# Patient Record
Sex: FEMALE | Race: WHITE | NOT HISPANIC OR LATINO | Employment: FULL TIME | URBAN - METROPOLITAN AREA
[De-identification: names, ages, dates, MRNs, and addresses within clinical notes are randomized per-mention and may not be internally consistent; named-entity substitution may affect disease eponyms.]

---

## 2023-08-24 ENCOUNTER — HOSPITAL ENCOUNTER (OUTPATIENT)
Dept: DATA CONVERSION | Facility: HOSPITAL | Age: 38
Discharge: HOME | End: 2023-08-24
Payer: COMMERCIAL

## 2023-08-24 DIAGNOSIS — Z01.419 ENCOUNTER FOR GYNECOLOGICAL EXAMINATION (GENERAL) (ROUTINE) WITHOUT ABNORMAL FINDINGS: ICD-10-CM

## 2023-08-24 DIAGNOSIS — Z11.51 ENCOUNTER FOR SCREENING FOR HUMAN PAPILLOMAVIRUS (HPV): ICD-10-CM

## 2023-09-01 ENCOUNTER — PATIENT OUTREACH (OUTPATIENT)
Dept: CARE COORDINATION | Facility: CLINIC | Age: 38
End: 2023-09-01
Payer: COMMERCIAL

## 2023-09-01 NOTE — PROGRESS NOTES
[facility] member [facility] [place] outreach.    Spoke with member. I introduced myself and purpose of call.  Confirmed : Yes  Admitted with sigmoid diverticulitis. Treated with bowel rest, IV fluids and IV antibiotics.  Discharged with 7 day dose of Amoxicillin-pot Clavulanate twice a day. Didn't  acetaminophen.   Reviewed soft diet, states given list to follow, no questions.   No new or worsening symptoms.  Denies F/V/SOB. Has had one loose BM.  Still need to scheduled PCP follow up and Appt with surgery Sai Sellers.   Available for assistance if needed.

## 2023-09-14 PROBLEM — F41.1 GENERALIZED ANXIETY DISORDER: Status: ACTIVE | Noted: 2021-03-22

## 2023-09-14 PROBLEM — F41.9 ANXIETY: Status: ACTIVE | Noted: 2023-09-14

## 2023-09-14 PROBLEM — R87.810 PAPANICOLAOU SMEAR OF CERVIX WITH POSITIVE HIGH RISK HUMAN PAPILLOMA VIRUS (HPV) TEST: Status: ACTIVE | Noted: 2023-09-14

## 2023-09-14 RX ORDER — OMEPRAZOLE 10 MG/1
10 CAPSULE, DELAYED RELEASE ORAL
COMMUNITY

## 2023-09-14 RX ORDER — HYDROXYZINE HYDROCHLORIDE 25 MG/1
25 TABLET, FILM COATED ORAL NIGHTLY
COMMUNITY
Start: 2023-07-05 | End: 2024-01-31 | Stop reason: WASHOUT

## 2023-09-14 RX ORDER — SERTRALINE HYDROCHLORIDE 25 MG/1
25 TABLET, FILM COATED ORAL DAILY
COMMUNITY
Start: 2022-06-14 | End: 2023-10-27 | Stop reason: SDUPTHER

## 2023-09-15 VITALS
BODY MASS INDEX: 23.23 KG/M2 | SYSTOLIC BLOOD PRESSURE: 118 MMHG | WEIGHT: 148 LBS | HEIGHT: 67 IN | DIASTOLIC BLOOD PRESSURE: 62 MMHG

## 2023-09-28 ENCOUNTER — DOCUMENTATION (OUTPATIENT)
Dept: CARE COORDINATION | Facility: CLINIC | Age: 38
End: 2023-09-28
Payer: COMMERCIAL

## 2023-10-25 ENCOUNTER — HOSPITAL ENCOUNTER (EMERGENCY)
Facility: HOSPITAL | Age: 38
Discharge: HOME | End: 2023-10-25
Attending: STUDENT IN AN ORGANIZED HEALTH CARE EDUCATION/TRAINING PROGRAM
Payer: COMMERCIAL

## 2023-10-25 VITALS
TEMPERATURE: 98.2 F | OXYGEN SATURATION: 97 % | SYSTOLIC BLOOD PRESSURE: 106 MMHG | WEIGHT: 151.01 LBS | DIASTOLIC BLOOD PRESSURE: 60 MMHG | HEART RATE: 65 BPM | HEIGHT: 66 IN | BODY MASS INDEX: 24.27 KG/M2 | RESPIRATION RATE: 16 BRPM
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
## 2023-10-25 PROCEDURE — 99281 EMR DPT VST MAYX REQ PHY/QHP: CPT | Performed by: STUDENT IN AN ORGANIZED HEALTH CARE EDUCATION/TRAINING PROGRAM

## 2023-10-25 ASSESSMENT — PAIN - FUNCTIONAL ASSESSMENT: PAIN_FUNCTIONAL_ASSESSMENT: 0-10

## 2023-10-25 ASSESSMENT — COLUMBIA-SUICIDE SEVERITY RATING SCALE - C-SSRS
1. IN THE PAST MONTH, HAVE YOU WISHED YOU WERE DEAD OR WISHED YOU COULD GO TO SLEEP AND NOT WAKE UP?: NO
6. HAVE YOU EVER DONE ANYTHING, STARTED TO DO ANYTHING, OR PREPARED TO DO ANYTHING TO END YOUR LIFE?: NO
2. HAVE YOU ACTUALLY HAD ANY THOUGHTS OF KILLING YOURSELF?: NO

## 2023-10-25 ASSESSMENT — PAIN SCALES - GENERAL: PAINLEVEL_OUTOF10: 0 - NO PAIN

## 2023-10-25 NOTE — DISCHARGE INSTRUCTIONS
You were seen in the emergency department today after a needlestick exposure.  As stated, the risk of blood-borne infection transmission is pretty low.  Please follow-up outpatient with occupational health.  In the event that you change your mind regarding postexposure prophylaxis or would like to discuss further treatment options, you can reach out to occupational health or return to the emergency department for recheck.

## 2023-10-25 NOTE — ED PROVIDER NOTES
HPI   Chief Complaint   Patient presents with    Body Fluid Exposure     Pt had an accidental needle stick upstairs in OB.        38-year-old female presents after needlestick exposure.  Patient was at work where she was actually stuck with a needle.  Patient states that the source patient has a known history of hepatitis a but no known history of hep C or HIV.  Last labs drawn on the source patient were in April of this year.  Source patient has a remote history of IV drug use.  Patient is not currently pregnant.                          No data recorded                Patient History   No past medical history on file.  No past surgical history on file.  Family History   Problem Relation Name Age of Onset    No Known Problems Mother      No Known Problems Father      No Known Problems Maternal Grandfather      Breast cancer Paternal Grandmother      Heart disease Paternal Grandmother       Social History     Tobacco Use    Smoking status: Not on file    Smokeless tobacco: Not on file   Substance Use Topics    Alcohol use: Not on file    Drug use: Not on file       Physical Exam   ED Triage Vitals [10/25/23 0611]   Temp Heart Rate Resp BP   36.8 °C (98.2 °F) 65 16 106/60      SpO2 Temp Source Heart Rate Source Patient Position   99 % Oral Monitor Sitting      BP Location FiO2 (%)     Right arm --       Physical Exam  Constitutional:       General: She is not in acute distress.  HENT:      Head: Normocephalic and atraumatic.      Mouth/Throat:      Mouth: Mucous membranes are moist.      Pharynx: Oropharynx is clear.   Eyes:      Extraocular Movements: Extraocular movements intact.      Conjunctiva/sclera: Conjunctivae normal.      Pupils: Pupils are equal, round, and reactive to light.   Cardiovascular:      Comments: Appears well perfused  Pulmonary:      Effort: Pulmonary effort is normal. No respiratory distress.   Abdominal:      General: There is no distension.   Musculoskeletal:         General: No deformity.  Normal range of motion.      Cervical back: Normal range of motion.   Skin:     General: Skin is dry.   Neurological:      General: No focal deficit present.      Mental Status: She is alert and oriented to person, place, and time. Mental status is at baseline.   Psychiatric:         Mood and Affect: Mood normal.         Behavior: Behavior normal.         ED Course & MDM   Diagnoses as of 10/25/23 0705   Accident caused by hypodermic needle, initial encounter       Medical Decision Making  38-year-old female presents after needlestick.  Considered HIV exposure, hepatitis C exposure, skin injury.  Nursing supervisor contacted and hospital policy and protocols regarding blood and body fluid exposure followed as outlined.  Spoke with the patient regarding postexposure prophylaxis for HIV.  Explained both risks and benefits of starting postexposure prophylaxis, including remote history of IV drug use of source patient, GI upset related to medication use, and concern if actively trying to conceive at this time as medications are known teratogens.  At this time patient declines postexposure prophylaxis treatment and will follow up with occupational health as outlined in the protocol.  Patient provided occupational health information.  Patient discharged.        Procedure  Procedures     Belen Stein MD  10/25/23 0768

## 2023-10-27 ENCOUNTER — E-VISIT (OUTPATIENT)
Dept: PRIMARY CARE | Facility: CLINIC | Age: 38
End: 2023-10-27
Payer: COMMERCIAL

## 2023-10-27 DIAGNOSIS — F41.1 GENERALIZED ANXIETY DISORDER: Primary | ICD-10-CM

## 2023-10-27 RX ORDER — SERTRALINE HYDROCHLORIDE 50 MG/1
50 TABLET, FILM COATED ORAL DAILY
Qty: 30 TABLET | Refills: 1 | Status: SHIPPED | OUTPATIENT
Start: 2023-10-27 | End: 2024-01-17

## 2023-10-27 NOTE — TELEPHONE ENCOUNTER
From: Nehal Rodriguez  To: Jeanette Thompson PA-C  Sent: 10/27/2023 10:36 AM EDT  Subject: Zoloft    Hi Dr. Thompson,  I have been back on zoloft for a while now and I feel my dosage needs to be increased. 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 Unless you feel something else would be a better option for me.  Thank you,  Nehal Rodriguez

## 2023-12-06 ENCOUNTER — OFFICE VISIT (OUTPATIENT)
Dept: PRIMARY CARE | Facility: CLINIC | Age: 38
End: 2023-12-06
Payer: COMMERCIAL

## 2023-12-06 VITALS
OXYGEN SATURATION: 99 % | DIASTOLIC BLOOD PRESSURE: 64 MMHG | BODY MASS INDEX: 24.11 KG/M2 | SYSTOLIC BLOOD PRESSURE: 108 MMHG | WEIGHT: 150 LBS | HEART RATE: 66 BPM | HEIGHT: 66 IN

## 2023-12-06 DIAGNOSIS — B00.1 COLD SORE: ICD-10-CM

## 2023-12-06 DIAGNOSIS — G89.29 CHRONIC RIGHT-SIDED LOW BACK PAIN WITH RIGHT-SIDED SCIATICA: ICD-10-CM

## 2023-12-06 DIAGNOSIS — S46.811A STRAIN OF RIGHT TRAPEZIUS MUSCLE, INITIAL ENCOUNTER: Primary | ICD-10-CM

## 2023-12-06 DIAGNOSIS — M54.41 CHRONIC RIGHT-SIDED LOW BACK PAIN WITH RIGHT-SIDED SCIATICA: ICD-10-CM

## 2023-12-06 PROCEDURE — 99214 OFFICE O/P EST MOD 30 MIN: CPT

## 2023-12-06 PROCEDURE — 1036F TOBACCO NON-USER: CPT

## 2023-12-06 RX ORDER — VALACYCLOVIR HYDROCHLORIDE 1 G/1
1000 TABLET, FILM COATED ORAL 2 TIMES DAILY
Qty: 4 TABLET | Refills: 1 | OUTPATIENT
Start: 2023-12-06 | End: 2024-03-07

## 2023-12-06 RX ORDER — CYCLOBENZAPRINE HCL 5 MG
TABLET ORAL
Qty: 30 TABLET | Refills: 0 | Status: SHIPPED | OUTPATIENT
Start: 2023-12-06 | End: 2024-01-31 | Stop reason: WASHOUT

## 2023-12-06 RX ORDER — PREDNISONE 10 MG/1
TABLET ORAL
Qty: 30 TABLET | Refills: 0 | Status: SHIPPED | OUTPATIENT
Start: 2023-12-06 | End: 2023-12-18

## 2023-12-06 ASSESSMENT — PATIENT HEALTH QUESTIONNAIRE - PHQ9
SUM OF ALL RESPONSES TO PHQ9 QUESTIONS 1 AND 2: 0
1. LITTLE INTEREST OR PLEASURE IN DOING THINGS: NOT AT ALL
2. FEELING DOWN, DEPRESSED OR HOPELESS: NOT AT ALL

## 2023-12-06 ASSESSMENT — PAIN SCALES - GENERAL: PAINLEVEL: 3

## 2023-12-06 ASSESSMENT — ENCOUNTER SYMPTOMS
NECK STIFFNESS: 1
BACK PAIN: 1
NECK PAIN: 1
MYALGIAS: 1

## 2023-12-06 NOTE — PROGRESS NOTES
"Subjective   Patient ID: Nehal Rodriguez is a 38 y.o. female who presents for Flank Pain (Pt c/o rt side pain /la).    Rt full body side pain. Patient states has had \"forever\" x 15 years. Patient was seeing chiropractor in 2013. Patient states has had symptoms of \"whip lash\" in neck.  Also tried massage (temporarily help). Has had xrays done through multiple chiropractor, states she was told she had loss of curvature in cervical spine.  Patient wants to get back into gym, but pain prevents working-out. Has tried Ibuprofen, naproxen, posture braces. Patient described as \"burning pain.\" Pain goes all the way down right leg. Specifically has right shoulder and neck pain and then right lower back pain that travels into foot and ankle. Lower body pain exacerbating with cardio, shoulder seems to be worse with cleaning and working. Recently went to podiatrist and had foot xrays for right foot, and everything was normal by podiatrist. Also has recurrent headaches. Tried short course of prednisone over 2 years ago which she states she doesn't remember making a different. Denies trauma, weakness.     Also would like refill on Valtrex for cold sores.        Review of Systems   Musculoskeletal:  Positive for back pain, myalgias, neck pain and neck stiffness. Negative for gait problem.       Objective   /64   Pulse 66   Ht 1.676 m (5' 6\")   Wt 68 kg (150 lb)   SpO2 99%   BMI 24.21 kg/m²     Physical Exam  Constitutional:       Appearance: Normal appearance.   Cardiovascular:      Rate and Rhythm: Normal rate.   Pulmonary:      Effort: Pulmonary effort is normal.   Musculoskeletal:      Right shoulder: No tenderness or bony tenderness. Normal range of motion. Normal strength.      Left shoulder: Normal strength.      Cervical back: Rigidity, spasms, tenderness and bony tenderness present. Normal range of motion.      Lumbar back: Tenderness present. No bony tenderness. Negative right straight leg raise test and " negative left straight leg raise test.        Back:       Right hip: No tenderness. Normal range of motion.      Left hip: No tenderness. Normal range of motion.      Comments: TTP in areas noted above. Significant amount of increased muscle tension in right trapezius muscle with muscle spasms. Negative Spurling test bilaterally.   Skin:     Findings: No rash.   Neurological:      Mental Status: She is alert.   Psychiatric:         Mood and Affect: Mood and affect normal.         Assessment/Plan   Diagnoses and all orders for this visit:  Strain of right trapezius muscle, initial encounter  -     predniSONE (Deltasone) 10 mg tablet; Take 4 tablets (40 mg) by mouth once daily for 3 days, THEN 3 tablets (30 mg) once daily for 3 days, THEN 2 tablets (20 mg) once daily for 3 days, THEN 1 tablet (10 mg) once daily for 3 days.  -     cyclobenzaprine (Flexeril) 5 mg tablet; Take 1 -2 tablets at night  Chronic right-sided low back pain with right-sided sciatica  -     predniSONE (Deltasone) 10 mg tablet; Take 4 tablets (40 mg) by mouth once daily for 3 days, THEN 3 tablets (30 mg) once daily for 3 days, THEN 2 tablets (20 mg) once daily for 3 days, THEN 1 tablet (10 mg) once daily for 3 days.  -     cyclobenzaprine (Flexeril) 5 mg tablet; Take 1 -2 tablets at night  Cold sore  -     valACYclovir (Valtrex) 1 gram tablet; Take 1 tablet (1,000 mg) by mouth 2 times a day. Take 2 tabs (2000 mg) 2 times a days for 1 day.  -Given Dr. Murray's card, if appointment time is far out, patient informed to call and will refer her to PT to have here while awaiting to get into Sports Medicine.

## 2023-12-07 ENCOUNTER — OFFICE VISIT (OUTPATIENT)
Dept: SPORTS MEDICINE | Facility: CLINIC | Age: 38
End: 2023-12-07
Payer: COMMERCIAL

## 2023-12-07 ENCOUNTER — ANCILLARY PROCEDURE (OUTPATIENT)
Dept: RADIOLOGY | Facility: CLINIC | Age: 38
End: 2023-12-07
Payer: COMMERCIAL

## 2023-12-07 VITALS
DIASTOLIC BLOOD PRESSURE: 62 MMHG | BODY MASS INDEX: 24.11 KG/M2 | HEART RATE: 70 BPM | HEIGHT: 66 IN | WEIGHT: 150 LBS | SYSTOLIC BLOOD PRESSURE: 102 MMHG

## 2023-12-07 DIAGNOSIS — M54.2 CERVICAL PAIN: ICD-10-CM

## 2023-12-07 DIAGNOSIS — M43.12 SPONDYLOLISTHESIS OF CERVICAL REGION: ICD-10-CM

## 2023-12-07 DIAGNOSIS — R29.898 NECK TIGHTNESS: ICD-10-CM

## 2023-12-07 DIAGNOSIS — S46.911A SHOULDER STRAIN, RIGHT, INITIAL ENCOUNTER: ICD-10-CM

## 2023-12-07 DIAGNOSIS — M54.12 RADICULOPATHY OF CERVICAL REGION: ICD-10-CM

## 2023-12-07 DIAGNOSIS — M50.30 DDD (DEGENERATIVE DISC DISEASE), CERVICAL: ICD-10-CM

## 2023-12-07 DIAGNOSIS — S46.811A TRAPEZIUS STRAIN, RIGHT, INITIAL ENCOUNTER: ICD-10-CM

## 2023-12-07 DIAGNOSIS — M54.12 RADICULOPATHY OF CERVICAL REGION: Primary | ICD-10-CM

## 2023-12-07 DIAGNOSIS — S16.1XXA CERVICAL STRAIN, ACUTE, INITIAL ENCOUNTER: ICD-10-CM

## 2023-12-07 PROCEDURE — 99214 OFFICE O/P EST MOD 30 MIN: CPT | Performed by: NURSE PRACTITIONER

## 2023-12-07 PROCEDURE — 1036F TOBACCO NON-USER: CPT | Performed by: NURSE PRACTITIONER

## 2023-12-07 PROCEDURE — 72050 X-RAY EXAM NECK SPINE 4/5VWS: CPT | Mod: FY

## 2023-12-07 ASSESSMENT — ENCOUNTER SYMPTOMS
DEPRESSION: 0
CONSTITUTIONAL NEGATIVE: 1
OCCASIONAL FEELINGS OF UNSTEADINESS: 0
RESPIRATORY NEGATIVE: 1
LOSS OF SENSATION IN FEET: 0
CARDIOVASCULAR NEGATIVE: 1

## 2023-12-07 ASSESSMENT — PAIN SCALES - GENERAL
PAINLEVEL: 3
PAINLEVEL_OUTOF10: 3

## 2023-12-07 ASSESSMENT — PATIENT HEALTH QUESTIONNAIRE - PHQ9
2. FEELING DOWN, DEPRESSED OR HOPELESS: NOT AT ALL
SUM OF ALL RESPONSES TO PHQ9 QUESTIONS 1 AND 2: 0
1. LITTLE INTEREST OR PLEASURE IN DOING THINGS: NOT AT ALL

## 2023-12-07 ASSESSMENT — PAIN DESCRIPTION - DESCRIPTORS: DESCRIPTORS: BURNING

## 2023-12-07 ASSESSMENT — PAIN - FUNCTIONAL ASSESSMENT: PAIN_FUNCTIONAL_ASSESSMENT: 0-10

## 2023-12-07 NOTE — PROGRESS NOTES
New patient  History Of Present Illness  Nehal Rodriguez is a 38 y.o. female presenting with right cervical spine pain with radiculopathy into her right shoulder. She has had pain and tightness in her neck about 15 years. She denies any specific EMANUEL. 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 She states her tightness is especially bad in the morning when she wakes up. She has been seening a chiropractor as well as a massage therapist with little relief. She was told by her chiropractor that she has a decreased curvature in her neck. She saw her PCP Jeanette Thompson PA-C on 12/6. She was given a prednisone taper and Flexeril and was graciously referred to the injury clinic for a further evaluation. She has tried heat, ice, stretching and posture braces with no relief. She rates her pain at 3/10 today. She wants to work out, but is unable to due to her neck pain. She describes it as sharp and burning. She denies pops, snaps and clicks. She denies numbness and tingling. She denies swelling and bruising. She also c/o right sided low back pain that radiates into her lower leg and foot. She expresses she would like to get that worked up at some point as well.       Past Medical History  She has no past medical history on file.    Surgical History  She has no past surgical history on file.     Social History  She reports that she has never smoked. She has never used smokeless tobacco. She reports current alcohol use of about 2.0 standard drinks of alcohol per week. She reports that she does not use drugs.    Family History  Family History   Problem Relation Name Age of Onset    No Known Problems Mother      No Known Problems Father      No Known Problems Maternal Grandfather      Breast cancer Paternal Grandmother      Heart disease Paternal Grandmother          Allergies  Oxycodone    Review of Systems  Review of Systems   Constitutional:  "Negative.    Respiratory: Negative.     Cardiovascular: Negative.    All other systems reviewed and are negative.       Last Recorded Vitals  /62 (BP Location: Left arm, Patient Position: Sitting, BP Cuff Size: Adult)   Pulse 70   Ht 1.676 m (5' 6\")   Wt 68 kg (150 lb)   BMI 24.21 kg/m²      Examination:  RIGHT CERVICAL   Erythema: Negative.   Edema: Negative.   Effusion: Negative.   TART Findings: Positive: Tissue Texture Changes, Restriction, Tenderness.   Warmth: Negative.   Ecchymosis/Bruising: Negative.   Percussion Test (CERVICAL): Positive: C3, C4, C5   Tuning Fork Test (CERVICAL): Negative.   Percussion Test (THORACIC):  Negative.   Tuning Fork Test (THORACIC):  Negative.   Abrasions: Negative.     Orientation (CERVICAL): Positive: decreased lordosis   Orientation (THORACIC):  Normal.     ROM (CERVICAL):Positive:  Decreased Forward Flexion (patient reports tightness), Extension (patient reports tightness), and Lateral Bending (Side Bending) (patient reports tightness).     ROM (THORACIC):   FROM, Flexion, Extension, Rotation, and Side Bending.     Muscle Strength:   +5/+5 Cervical Flexion, Extension, Left Rotation, Right Rotation, Left Lateral Flexion, Right Lateral Flexion, Trapezius (Shrug), Anterior Deltoid, Posterior Deltoid and Lateral Deltoid.          DTR/Neurological: Symmetrical  +2/+4 Biceps Reflex (C5)  +2/+4 Brachioradialis Reflex (C6)  +2/+4 Triceps Reflex (C7).     Sensation/Neurological Cervical:   Negative, Symmetrical:  C2: Lower jaw and back of head  C3: Upper neck and back of head  C4: Lower neck, upper shoulders, and upper chest  C5: Area of collarbones, lateral upper arms, and upper chest  C6: Lateral forearms, thumbs, anterior index finger, and lateral half of the middle finger  C7: Some of posterior index finger, medial half of middle finger, upper posterior back, and back of arms  C8: Ring finger, little finger, medial forearm, and posterior upper back. "     Sensation/Neurological Thoracic:   Negative, Symmetrical; 2-Point Discrimination Test: Negative  T1: Medial anterior upper arm, axilla, upper chest and posterior back  T2: Upper chest and posterior back  T3: Upper chest and posterior back  T4: Upper chest (nipple area) and posterior back  T5: Mid chest and posterior back  T6: Mid chest and posterior back  T7: Mid chest and posterior back  T8: Upper abdomen and middle posterior back  T9: Upper abdomen and middle posterior back  T10: Abdomen (area of belly button) and middle posterior back  T11: Abdomen and middle posterior back  T12: Lower abdomen and middle posterior back.     Palpation:   Positive: Tender to palpation over upper trapezius, cervical paraspinal musculature, rhomboid     Vascular:   +2/+4,Carotid  +2/+4 Radial  Capillary Refill< 2 seconds.     Cervicle Spine Tests:  Lhermitte's Sign: Positive:  Spurling's Test (Atlanto-Axial Compression Test): Negative.   Reverse Spurling's Test: Negative.   Adson's Test: Negative.   Tony's Test: Negative.   Durand's Test: Negative.   Costoclavicular Test: Negative.   Cervical Spine Distraction Test: Negative.   Tinel's Sign: Negative.   Brachial Plexus Tension Test: Negative.   Brachial Plexus Stretch Test: Negative.   Neutral Axial Compression Test: Negative.   Shoulder ABduction (Bakody) Test: Negative.   Cervical Compression with Max Foraminal Compression Test: Positive:  Intervertebral Foramina Compression Test: Negative.   Flexion Compression Test: Negative.   Extension Compression Test: Negative.   Maximum Compression of the Intervertebral Foramina Test: Negative.   Forward Flexion Test: Negative.   Swallowing Test: Negative.   Percussion Test: Negative.   Clark-العلي Test: Negative.   O'Larissa Test: Negative.   Shoulder Press Test: Negative.   Caleb Compression Test: Negative.   Rotation Compression Test: Negative.   Side Bending Compression Test: Negative.   Thoracic Outlet Test: Negative.   Vertebral  Artery Test: Negative.   Valsalva Maneuver Test: Negative.         Thoracic Spine Tests:  Pillai Forward Bend Test: Negative.   Brudzinski's/Kernig's Test: Negative.   Slump Test: Negative.   First Thoracic Neve Root Test: Negative.   Passive Scapular Approximation: Negative.   Scapular Elevation Test: Negative.   Scapular Retraction Test: Negative.   Scapular Protraction (Winging): Negative.   Costoclavicular Test: Negative.   Elevated Arms Stress Test (Roberto Test): Negative.   Kyphosis Test on Hands and Knees: Negative.   Segmental Function in the Thoracic Spine in Extension Test: Negative.         Chest Tests:  Sternum Compression Test: Negative.   Costosternal Pressure Test: Negative.   Rib Compression Test: Negative.   Schepelmann Test: Negative.   Pectoralis Major Contracture Test: Negative.   Chest Circumference Test: Negative.            Imaging and Diagnostics Review:  Radiology  X-ray of cervical spine and MRI of cervical spine ordered at today's visit     Findings:      Assessment   1. Radiculopathy of cervical region    2. Cervical pain    3. Cervical strain, acute, initial encounter    4. Trapezius strain, right, initial encounter    5. Neck tightness    6. Shoulder strain, right, initial encounter    7. DDD (degenerative disc disease), cervical    8. Spondylolisthesis of cervical region        Plan   Treatment or Intervention:  May continue to alternate ice and moist heat as needed   Start into physical therapy 1-2 times a week for 10-12 weeks with manual therapy, dry needling, IASTM, and traction   Stressed the importance of wearing shoes with good stability control to help with the biomechanics affecting the spine   Stressed the importance of wearing full foot insoles to help with the biomechanics affecting the spine and to provide cushioning    Recommendation over-the-counter Move Free for joint health.   Patient advised regarding the risks and/or potential adverse reactions and/or side effects of any  prescribed medications along with any over-the-counter medications or any supplements used. Patient advised to seek immediate medical care if any adverse reactions occur. The patient verbalized their understanding   MRI of the CERVICAL spine to rule out herniated disc versus stress fracture versus other   Follow-up after CERVICAL spine MRI       Diagnostic studies:   Interpreted By:  Ky Amin,   STUDY:  XR CERVICAL SPINE COMPLETE 4-5 VIEWS; 12/7/2023 10:35 am      INDICATION:  Signs/Symptoms:neck pain right-sided neck pain for 15 years. No known  injury.      COMPARISON:  Thoracic spine radiograph 07/15/2011      ACCESSION NUMBER(S):  [id number]      ORDERING CLINICIAN:  JOSE LUIS HARRISON      TECHNIQUE:  4 views of the cervical spine were performed.      FINDINGS:  C1 through T1 are demonstrated on the lateral view. No compression  deformity or prevertebral soft tissue swelling detected. There is  straightening of the normal cervical lordosis. Grade 1  anterolisthesis of C7 on T1. Mild disc space narrowing is seen at  C5-C6 with tiny osteophyte formation. There is mild left neural  foraminal narrowing at C5-C6 in the setting of uncovertebral joint  hypertrophy. There is also mild right neural foraminal narrowing at  C4-C5.      IMPRESSION:  Mild cervical spondylosis, as detailed above.      If symptoms persist, consider MRI for further evaluation.      MACRO:  None.      Signed by: Ky Amin 12/7/2023 10:39 AM  Dictation workstation:   [id number]    Activity Instructions, Restrictions, and Accommodations:  None.     Consultations/Referrals:  Physical Therapy     Follow-up:  After MRI of cervical spine       JOSE LUIS HARRISON on 12/7/23 at 12:27 PM.     Jose Luis Harrison CNP

## 2023-12-07 NOTE — PATIENT INSTRUCTIONS
TREATMENT PLAN:  May continue to alternate ice and moist heat as needed   Start into physical therapy 1-2 times a week for 10-12 weeks with manual therapy, dry needling, IASTM, and traction   Stressed the importance of wearing shoes with good stability control to help with the biomechanics affecting the spine   Stressed the importance of wearing full foot insoles to help with the biomechanics affecting the spine and to provide cushioning    Recommendation over-the-counter Move Free for joint health.   Patient advised regarding the risks and/or potential adverse reactions and/or side effects of any prescribed medications along with any over-the-counter medications or any supplements used. Patient advised to seek immediate medical care if any adverse reactions occur. The patient verbalized their understanding   MRI of the CERVICAL spine to rule out herniated disc versus stress fracture versus other   Follow-up after CERVICAL spine MRI

## 2023-12-15 ENCOUNTER — EVALUATION (OUTPATIENT)
Dept: PHYSICAL THERAPY | Facility: CLINIC | Age: 38
End: 2023-12-15
Payer: COMMERCIAL

## 2023-12-15 DIAGNOSIS — M50.30 DDD (DEGENERATIVE DISC DISEASE), CERVICAL: ICD-10-CM

## 2023-12-15 DIAGNOSIS — S16.1XXA CERVICAL STRAIN, ACUTE, INITIAL ENCOUNTER: ICD-10-CM

## 2023-12-15 DIAGNOSIS — S46.911A SHOULDER STRAIN, RIGHT, INITIAL ENCOUNTER: ICD-10-CM

## 2023-12-15 DIAGNOSIS — S46.811A TRAPEZIUS STRAIN, RIGHT, INITIAL ENCOUNTER: ICD-10-CM

## 2023-12-15 DIAGNOSIS — M54.12 RADICULOPATHY OF CERVICAL REGION: ICD-10-CM

## 2023-12-15 DIAGNOSIS — R29.898 NECK TIGHTNESS: ICD-10-CM

## 2023-12-15 DIAGNOSIS — M54.2 CERVICAL PAIN: ICD-10-CM

## 2023-12-15 PROCEDURE — 97162 PT EVAL MOD COMPLEX 30 MIN: CPT | Mod: GP

## 2023-12-15 PROCEDURE — 97110 THERAPEUTIC EXERCISES: CPT | Mod: GP

## 2023-12-15 PROCEDURE — 97014 ELECTRIC STIMULATION THERAPY: CPT | Mod: GP

## 2023-12-15 ASSESSMENT — ENCOUNTER SYMPTOMS
PAIN SCALE AT LOWEST: 1
QUALITY: BURNING
PAIN SCALE: 3
QUALITY: DULL ACHE
PAIN SCALE AT HIGHEST: 7
HEADACHES: 1
ALLEVIATING FACTORS: REST

## 2023-12-15 ASSESSMENT — PAIN - FUNCTIONAL ASSESSMENT: PAIN_FUNCTIONAL_ASSESSMENT: 0-10

## 2023-12-15 ASSESSMENT — PAIN SCALES - GENERAL: PAINLEVEL_OUTOF10: 3

## 2023-12-15 ASSESSMENT — ACTIVITIES OF DAILY LIVING (ADL): POOR_BALANCE: 1

## 2023-12-15 NOTE — PROGRESS NOTES
Physical Therapy Evaluation and Treatment     Patient Name: Nehal Rodriguez  MRN: [id number]  PT Received On: 12/15/23  Time Calculation  Start Time: 0840  Stop Time: 0930  Time Calculation (min): 50 min  PT Evaluation Time Entry  PT Evaluation (Moderate) Time Entry: 28  PT Modalities Time Entry  E-Stim (Unattended) Time Entry: 12  PT Therapeutic Procedures Time Entry  Therapeutic Exercise Time Entry: 10    Current Problem:   1. Cervical pain  Referral to Physical Therapy    Follow Up In Physical Therapy      2. Trapezius strain, right, initial encounter  Referral to Physical Therapy    Follow Up In Physical Therapy      3. Neck tightness  Referral to Physical Therapy    Follow Up In Physical Therapy      4. Radiculopathy of cervical region  Referral to Physical Therapy    Follow Up In Physical Therapy      5. Cervical strain, acute, initial encounter  Referral to Physical Therapy    Follow Up In Physical Therapy      6. DDD (degenerative disc disease), cervical  Referral to Physical Therapy    Follow Up In Physical Therapy      7. Shoulder strain, right, initial encounter  Referral to Physical Therapy    Follow Up In Physical Therapy        Precautions:       Subjective Evaluation    History of Present Illness  Mechanism of injury: Pt presents with chronic neck/shoulder pain for years. C/o burning/pinching/aching in R sided of neck into UT and posterior cuff. Notes UT gets super tight. Consistent HA's; on right side of face.   No pain on left side. Progressively worse throughout the day. No triggering factor. Pt has been to massage/chiropractor with no lasting relief. On Prednisone with some mild relief.     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. Was  for years: does everything with R hand.    Also c/o RLE calf pain/ache. Any cardio/prolonged sitting: calf ache and lateral foot pain     Unable to workout now; on medication with zoloft whereas was not before. Wants to feel better and be able to  workout without pain.     Pain  Current pain rating: 3  At best pain ratin  At worst pain ratin  Quality: burning and dull ache  Relieving factors: rest  Progression: no change    Hand dominance: left (does everything with right hand)    Diagnostic Tests  X-ray: abnormal    Treatments  Previous treatment: chiropractic, massage and medication  Patient Goals  Patient goals for therapy: decreased pain, increased strength and return to sport/leisure activities         XR: Cervical Spice 23  IMPRESSION:  Mild cervical spondylosis (see imaging).     Objective     Special Questions  Patient is experiencing headaches.     Postural Observations  Seated posture: good  Standing posture: good      Neurological Testing     Sensation   Cervical/Thoracic   Left   Intact: light touch    Right   Intact: light touch    Palpation     Right   Hypertonic in the cervical paraspinals, rhomboids and upper trapezius. Tenderness of the cervical paraspinals, rhomboids, suboccipitals and upper trapezius.   Trigger point to upper trapezius.     Tenderness   Cervical Spine   Tenderness in the spinous process.     Additional Tenderness Details  Cervical hypomobility and TTP to C1/C2, C4/5, C5/6, and CTJ junction    Active Range of Motion   Cervical/Thoracic Spine     Thoracic   Left lateral flexion: with pain  Right rotation: with pain    Strength/Myotome Testing     Left Shoulder     Planes of Motion   Extension: 4-   Abduction: 4-   External rotation at 0°: 4-   Internal rotation at 0°: 4-     Right Shoulder     Planes of Motion   Extension: 4-   Abduction: 4-   External rotation at 0°: 4-   Internal rotation at 0°: 4-     Tests   Cervical     Left   Negative cervical distraction.     Right   Positive ULTT1 and ULTT2.   Negative cervical distraction.           Outcome Measure:   Other Measures  Neck Disability Index: 13/50 26% impairment    Treatments:  Therapeutic Exercise:   Reviewed and completed HEP    Modalities:   Dry  needling following informed consent: with e-stim; 2Hz, mA to tolerance, applied to R suboccipitals, paraspinals and UT, for 12 minutes.  Performed by AK, PT     Assessment & Plan     Assessment  Impairments: abnormal muscle tone, abnormal or restricted ROM, impaired balance, impaired physical strength, lacks appropriate home exercise program and pain with function  Assessment details: 38 year old patient presenting with chronic right sided neck and arm pain. Pt displays UE weakness, impaired posture, ROM deficits, pain, headaches and decreased functional mobility. Moderate complexity due to patient's clinical presentation being evolving with changing characteristics, with comorbidities to include chronicity of pain, neural tension, lumbar radiculopathy, and anxiety, all of which may negatively impact rehab tolerance and progression. Pt to benefit from skilled PT to address above impairments.  Prognosis: good    Plan  Therapy options: will be seen for skilled physical therapy services  Planned modality interventions: cryotherapy, electrical stimulation/Russian stimulation, traction, ultrasound, TENS, low level laser therapy and thermotherapy (hydrocollator packs)  Other planned modality interventions: Dry Needling  Planned therapy interventions: manual therapy, neuromuscular re-education, soft tissue mobilization, strengthening, stretching, spinal/joint mobilization, joint mobilization, home exercise program, functional ROM exercises, flexibility, abdominal trunk stabilization and body mechanics training  Frequency: 2x week  Duration in visits: 12  Duration in weeks: 6  Treatment plan discussed with: patient  Plan details: [facility] - Auth after 30 visits - 0 used per Soarian, check Epic and with patient for other visits used. 90% coverage until OOP is met. OOP [id number] - $5522 met.       Post-Treatment Pain:  Response to Interventions: Decrease     Goals:   Active       Neck Pain       LTG's       Start:  12/18/23     Expected End:  03/17/24       - Patient will demonstrate independence and report compliance with HEP to facilitate independent rehab program upon discharge.  - Pain at worst will be no more than 2/10 to allow patient to participate in home and community activities.  - Pt will increase muscle strength by >/= 1/2 MMT to provide improved stability and ability to perform activities such as reading, resume full work duties without pain, able to perform ADLs/IADLs without pain.  - Patient will demo decrease of NDI by 10% to demo improved function and meet MCI  - Pt will report ability to work without increase in symptoms  - Pt will report decrease in frequency and intensity of HA's

## 2023-12-26 ENCOUNTER — HOSPITAL ENCOUNTER (OUTPATIENT)
Dept: RADIOLOGY | Facility: HOSPITAL | Age: 38
Discharge: HOME | End: 2023-12-26
Payer: COMMERCIAL

## 2023-12-26 DIAGNOSIS — R29.898 NECK TIGHTNESS: ICD-10-CM

## 2023-12-26 DIAGNOSIS — S46.811A TRAPEZIUS STRAIN, RIGHT, INITIAL ENCOUNTER: ICD-10-CM

## 2023-12-26 DIAGNOSIS — M50.30 DDD (DEGENERATIVE DISC DISEASE), CERVICAL: ICD-10-CM

## 2023-12-26 DIAGNOSIS — M54.12 RADICULOPATHY OF CERVICAL REGION: ICD-10-CM

## 2023-12-26 DIAGNOSIS — S46.911A SHOULDER STRAIN, RIGHT, INITIAL ENCOUNTER: ICD-10-CM

## 2023-12-26 DIAGNOSIS — M54.2 CERVICAL PAIN: ICD-10-CM

## 2023-12-26 DIAGNOSIS — S16.1XXA CERVICAL STRAIN, ACUTE, INITIAL ENCOUNTER: ICD-10-CM

## 2023-12-26 PROCEDURE — 72141 MRI NECK SPINE W/O DYE: CPT

## 2023-12-27 NOTE — PROGRESS NOTES
"Physical Therapy Treatment    Patient Name: Nehal Rodriguez  MRN: [id number]  PT Received On: 23  Time Calculation  Start Time: 0835  Stop Time: 920  Time Calculation (min): 45 min  PT Modalities Time Entry  Mechanical Traction Time Entry: 12  PT Therapeutic Procedures Time Entry  Therapeutic Exercise Time Entry: 25    Visit Number: 2  Visits/Dates Authorized: 12 (40 max)    Current Problem:   1. Cervical pain  Follow Up In Physical Therapy      2. Trapezius strain, right, initial encounter  Follow Up In Physical Therapy      3. Neck tightness  Follow Up In Physical Therapy      4. Radiculopathy of cervical region  Follow Up In Physical Therapy      5. Cervical strain, acute, initial encounter  Follow Up In Physical Therapy      6. DDD (degenerative disc disease), cervical  Follow Up In Physical Therapy      7. Shoulder strain, right, initial encounter  Follow Up In Physical Therapy        Imagin23 MRI Cervical  IMPRESSION:  1. Mild disc bulge C5/6 with subtle asymmetric right paracentral disc protrusion with mild effacement of the ventral CSF space. No narrowing of the spinal canal.    2. Central annulus tear C6/7 without disc herniation.    3. No foraminal narrowing within the cervical spine.       Subjective   General:   Pt states felt good for a few days but unsure if it was related to needling or the prednisone. Presents today with no significant pain but stiffness. Pt sees doctor next week.     Pre-Treatment Symptoms:   Pain Assessment: 0-10  Pain Score:  (stiffness)    Objective   Findings:     Treatments:  Therapeutic Exercise:   UBE L1 x6'  Cervical nods  Neck retractions x15   DNF 10x8\" - discomfort   Open book x10, tband x10 to R feels low back discomfort   Quad neck retractions, with rotation x6 ea  Thread the needle x12   FT row 10# 2x12   FT shld ext 10# 2x12  Shrugs 9# x20     Modalities:  Mechanical traction: Intermittent: 14 lbs and 45 seconds on, 8 lbs and 15 seconds off, for 12 " minutes in Supine, with wedge     DNP:  Dry needling following informed consent: with e-stim; 2Hz, mA to tolerance, applied to R suboccipitals, paraspinals and UT, for 12 minutes.  Performed by AK, PT      Assessment:   Pt tolerated tx with some discomfort with mobility exercises. Decreased thoracic rotation L>R. Denies any numbness/tingling but continued c/o discomfort and stiffness. Trialed traction. Assess response next visit.       Post-Treatment Symptoms:   Response to Interventions: Sore    Plan:   Continue POC     Goals:   Active       Neck Pain       LTG's       Start:  12/18/23    Expected End:  03/17/24       - Patient will demonstrate independence and report compliance with HEP to facilitate independent rehab program upon discharge.  - Pain at worst will be no more than 2/10 to allow patient to participate in home and community activities.  - Pt will increase muscle strength by >/= 1/2 MMT to provide improved stability and ability to perform activities such as reading, resume full work duties without pain, able to perform ADLs/IADLs without pain.  - Patient will demo decrease of NDI by 10% to demo improved function and meet MCI  - Pt will report ability to work without increase in symptoms  - Pt will report decrease in frequency and intensity of HA's

## 2023-12-29 ENCOUNTER — TREATMENT (OUTPATIENT)
Dept: PHYSICAL THERAPY | Facility: CLINIC | Age: 38
End: 2023-12-29
Payer: COMMERCIAL

## 2023-12-29 DIAGNOSIS — S46.911A SHOULDER STRAIN, RIGHT, INITIAL ENCOUNTER: ICD-10-CM

## 2023-12-29 DIAGNOSIS — M54.12 RADICULOPATHY OF CERVICAL REGION: ICD-10-CM

## 2023-12-29 DIAGNOSIS — S16.1XXA CERVICAL STRAIN, ACUTE, INITIAL ENCOUNTER: ICD-10-CM

## 2023-12-29 DIAGNOSIS — M50.30 DDD (DEGENERATIVE DISC DISEASE), CERVICAL: ICD-10-CM

## 2023-12-29 DIAGNOSIS — S46.811A TRAPEZIUS STRAIN, RIGHT, INITIAL ENCOUNTER: ICD-10-CM

## 2023-12-29 DIAGNOSIS — M54.2 CERVICAL PAIN: ICD-10-CM

## 2023-12-29 DIAGNOSIS — R29.898 NECK TIGHTNESS: ICD-10-CM

## 2023-12-29 PROCEDURE — 97012 MECHANICAL TRACTION THERAPY: CPT | Mod: GP

## 2023-12-29 PROCEDURE — 97110 THERAPEUTIC EXERCISES: CPT | Mod: GP

## 2023-12-29 ASSESSMENT — PAIN - FUNCTIONAL ASSESSMENT: PAIN_FUNCTIONAL_ASSESSMENT: 0-10

## 2023-12-29 NOTE — PROGRESS NOTES
"Physical Therapy Treatment    Patient Name: Nehal Rodriguez  MRN: [id number]          Visit Number: 3  Visits/Dates Authorized: 12 (40 max)    Current Problem:   No diagnosis found.    Imagin23 MRI Cervical  IMPRESSION:  1. Mild disc bulge C5/6 with subtle asymmetric right paracentral disc protrusion with mild effacement of the ventral CSF space. No narrowing of the spinal canal.    2. Central annulus tear C6/7 without disc herniation.    3. No foraminal narrowing within the cervical spine.       Subjective   General:   Pt saw doctor yesterday.      Pre-Treatment Symptoms:        Objective   Findings:     Treatments:  Therapeutic Exercise:   UBE L1 x6'  Cervical nods  Neck retractions x15   DNF 10x8\" - discomfort   Open book x10, tband x10 to R feels low back discomfort   Quad neck retractions, with rotation x6 ea  Thread the needle x12   FT row 10# 2x12   FT shld ext 10# 2x12  Shrugs 9# x20     Modalities:  Mechanical traction: Intermittent: 14 lbs and 45 seconds on, 8 lbs and 15 seconds off, for 12 minutes in Supine, with wedge     DNP:  Dry needling following informed consent: with e-stim; 2Hz, mA to tolerance, applied to R suboccipitals, paraspinals and UT, for 12 minutes.  Performed by AK, PT      Assessment:   Pt tolerated tx with some discomfort with mobility exercises. Decreased thoracic rotation L>R. Denies any numbness/tingling but continued c/o discomfort and stiffness. Trialed traction. Assess response next visit.       Post-Treatment Symptoms:        Plan:   Continue POC     Goals:       "

## 2024-01-01 PROBLEM — R29.898 NECK TIGHTNESS: Status: ACTIVE | Noted: 2024-01-01

## 2024-01-01 PROBLEM — S46.911D RIGHT SHOULDER STRAIN, SUBSEQUENT ENCOUNTER: Status: ACTIVE | Noted: 2023-12-07

## 2024-01-01 PROBLEM — M43.12 SPONDYLOLISTHESIS OF CERVICAL REGION: Status: ACTIVE | Noted: 2024-01-01

## 2024-01-01 PROBLEM — S16.1XXD CERVICAL STRAIN, SUBSEQUENT ENCOUNTER: Status: ACTIVE | Noted: 2023-12-07

## 2024-01-01 PROBLEM — S16.1XXA STRAIN OF CERVICAL PORTION OF RIGHT TRAPEZIUS MUSCLE: Status: ACTIVE | Noted: 2024-01-01

## 2024-01-01 PROBLEM — M50.30 BULGING OF CERVICAL INTERVERTEBRAL DISC: Status: ACTIVE | Noted: 2024-01-01

## 2024-01-01 ASSESSMENT — ENCOUNTER SYMPTOMS
CARDIOVASCULAR NEGATIVE: 1
CONSTITUTIONAL NEGATIVE: 1
RESPIRATORY NEGATIVE: 1

## 2024-01-01 NOTE — PATIENT INSTRUCTIONS
May continue to alternate ice and moist heat as needed    Continue physical therapy 1-2 times a week for 10-12 weeks with manual therapy, dry needling, IASTM, and traction   Again stressed the importance of wearing shoes with good stability control to help with the biomechanics affecting the spine    Again stressed the importance of wearing full foot insoles to help with the biomechanics affecting the spine and to provide cushioning    Recommendation over-the-counter calcium with vitamin-D 2 -3000+ milligrams a day, as well as OTC symphytum as directed daily to promote bony healing, in addition to a daily multivitamin.  Recommendation over-the-counter Move Free for joint health.    May take OTC Tylenol Extra Strength or OTC Tylenol Arthritis, taking one every 6-8 hours with food as needed,  Patient advised regarding the risks and/or potential adverse reactions and/or side effects of any prescribed medications along with any over-the-counter medications or any supplements used. Patient advised to seek immediate medical care if any adverse reactions occur. The patient and/or patient(s) parent(s) verbalized their understanding  Reviewed CERVICAL spine MRI in detail with the patient and/or patients parent/legal guardian to their level of understanding; a copy of these results were provided to the patient and/or patients parent/legal guardian at the time of this office visit.      Follow up 6 weeks

## 2024-01-01 NOTE — PROGRESS NOTES
"Established patient  History Of Present Illness  Nehal Rodriguez is a 38 y.o. female presenting for her MRI follow up of her CERVICAL spine. Pt states that she feels roughly the same as previous visit. Rates current pain as a 3/10 describing it as a burning pain which is constant due to having worked last night. Pt has completed two physical therapy appointments as of today's visit and has yet to notice any improvement or worsening of pain.  We reviewed patient MRI results in detail.  We discussed treatment options and will have patient continue with physical therapy and can reevaluate in 6 weeks.  If patient shows no improvement we can consider referral to pain management for further treatment.  Patient verbalizes understanding and agreement with plan of care.    Past Medical History  She has no past medical history on file.    Surgical History  She has no past surgical history on file.     Social History  She reports that she has never smoked. She has never used smokeless tobacco. She reports current alcohol use of about 2.0 standard drinks of alcohol per week. She reports that she does not use drugs.    Family History  Family History   Problem Relation Name Age of Onset    No Known Problems Mother      No Known Problems Father      No Known Problems Maternal Grandfather      Breast cancer Paternal Grandmother      Heart disease Paternal Grandmother          Allergies  Oxycodone    Review of Systems  Review of Systems   Constitutional: Negative.    Respiratory: Negative.     Cardiovascular: Negative.    All other systems reviewed and are negative.       Last Recorded Vitals  /64 (BP Location: Right arm, Patient Position: Sitting, BP Cuff Size: Adult)   Pulse 71   Ht 1.676 m (5' 6\")   Wt 68 kg (150 lb)   BMI 24.21 kg/m²      Examination:  RIGHT CERVICAL   Erythema: Negative.   Edema: Negative.   Effusion: Negative.   TART Findings: Positive: Tissue Texture Changes, Restriction, Tenderness.   Warmth: " Negative.   Ecchymosis/Bruising: Negative.   Percussion Test (CERVICAL): Positive: C3, C4, C5   Tuning Fork Test (CERVICAL): Negative.   Percussion Test (THORACIC):  Negative.   Tuning Fork Test (THORACIC):  Negative.   Abrasions: Negative.      Orientation (CERVICAL): Positive: decreased lordosis   Orientation (THORACIC):  Normal.      ROM (CERVICAL):Positive:  Decreased Forward Flexion (patient reports tightness), Extension (patient reports tightness), and Lateral Bending (Side Bending) (patient reports tightness).      ROM (THORACIC):   FROM, Flexion, Extension, Rotation, and Side Bending.      Muscle Strength:   +5/+5 Cervical Flexion, Extension, Left Rotation, Right Rotation, Left Lateral Flexion, Right Lateral Flexion, Trapezius (Shrug), Anterior Deltoid, Posterior Deltoid and Lateral Deltoid.          DTR/Neurological: Symmetrical  +2/+4 Biceps Reflex (C5)  +2/+4 Brachioradialis Reflex (C6)  +2/+4 Triceps Reflex (C7).      Sensation/Neurological Cervical:   Negative, Symmetrical:  C2: Lower jaw and back of head  C3: Upper neck and back of head  C4: Lower neck, upper shoulders, and upper chest  C5: Area of collarbones, lateral upper arms, and upper chest  C6: Lateral forearms, thumbs, anterior index finger, and lateral half of the middle finger  C7: Some of posterior index finger, medial half of middle finger, upper posterior back, and back of arms  C8: Ring finger, little finger, medial forearm, and posterior upper back.      Sensation/Neurological Thoracic:   Negative, Symmetrical; 2-Point Discrimination Test: Negative  T1: Medial anterior upper arm, axilla, upper chest and posterior back  T2: Upper chest and posterior back  T3: Upper chest and posterior back  T4: Upper chest (nipple area) and posterior back  T5: Mid chest and posterior back  T6: Mid chest and posterior back  T7: Mid chest and posterior back  T8: Upper abdomen and middle posterior back  T9: Upper abdomen and middle posterior back  T10:  Abdomen (area of belly button) and middle posterior back  T11: Abdomen and middle posterior back  T12: Lower abdomen and middle posterior back.      Palpation:   Positive: Tender to palpation over upper trapezius, cervical paraspinal musculature, rhomboid      Vascular:   +2/+4,Carotid  +2/+4 Radial  Capillary Refill< 2 seconds.      Cervicle Spine Tests:  Lhermitte's Sign: Positive:  Spurling's Test (Atlanto-Axial Compression Test): Negative.   Reverse Spurling's Test: Negative.   Adson's Test: Negative.   Tony's Test: Negative.   Durand's Test: Negative.   Costoclavicular Test: Negative.   Cervical Spine Distraction Test: Negative.   Tinel's Sign: Negative.   Brachial Plexus Tension Test: Negative.   Brachial Plexus Stretch Test: Negative.   Neutral Axial Compression Test: Negative.   Shoulder ABduction (Bakody) Test: Negative.   Cervical Compression with Max Foraminal Compression Test: Positive:  Intervertebral Foramina Compression Test: Negative.   Flexion Compression Test: Negative.   Extension Compression Test: Negative.   Maximum Compression of the Intervertebral Foramina Test: Negative.   Forward Flexion Test: Negative.   Swallowing Test: Negative.   Percussion Test: Negative.   Clark-العلي Test: Negative.   O'Larissa Test: Negative.   Shoulder Press Test: Negative.   Caleb Compression Test: Negative.   Rotation Compression Test: Negative.   Side Bending Compression Test: Negative.   Thoracic Outlet Test: Negative.   Vertebral Artery Test: Negative.   Valsalva Maneuver Test: Negative.          Thoracic Spine Tests:  Pillai Forward Bend Test: Negative.   Brudzinski's/Kernig's Test: Negative.   Slump Test: Negative.   First Thoracic Neve Root Test: Negative.   Passive Scapular Approximation: Negative.   Scapular Elevation Test: Negative.   Scapular Retraction Test: Negative.   Scapular Protraction (Winging): Negative.   Costoclavicular Test: Negative.   Elevated Arms Stress Test (Roberto Test): Negative.    Kyphosis Test on Hands and Knees: Negative.   Segmental Function in the Thoracic Spine in Extension Test: Negative.       Imaging and Diagnostics Review:  No new radiographs were obtained today.      Assessment   1. Bulging of cervical intervertebral disc    2. Cervical pain    3. Radiculopathy of cervical region    4. Strain of cervical portion of right trapezius muscle    5. Cervical strain, subsequent encounter    6. Neck tightness    7. Right shoulder strain, subsequent encounter    8. DDD (degenerative disc disease), cervical    9. Spondylolisthesis of cervical region        Plan   Treatment or Intervention:  May continue to alternate ice and moist heat as needed    Continue physical therapy 1-2 times a week for 10-12 weeks with manual therapy, dry needling, IASTM, and traction   Again stressed the importance of wearing shoes with good stability control to help with the biomechanics affecting the spine    Again stressed the importance of wearing full foot insoles to help with the biomechanics affecting the spine and to provide cushioning    Recommendation over-the-counter calcium with vitamin-D 2 -3000+ milligrams a day, as well as OTC symphytum as directed daily to promote bony healing, in addition to a daily multivitamin.  Recommendation over-the-counter Move Free for joint health.    May take OTC Tylenol Extra Strength or OTC Tylenol Arthritis, taking one every 6-8 hours with food as needed,  Patient advised regarding the risks and/or potential adverse reactions and/or side effects of any prescribed medications along with any over-the-counter medications or any supplements used. Patient advised to seek immediate medical care if any adverse reactions occur. The patient and/or patient(s) parent(s) verbalized their understanding  Reviewed CERVICAL spine MRI in detail with the patient and/or patients parent/legal guardian to their level of understanding; a copy of these results were provided to the patient  and/or patients parent/legal guardian at the time of this office visit.        Diagnostic studies:    MR cervical spine wo IV contrast  Narrative: Interpreted By:  Dianne Brown,   STUDY:  MR CERVICAL SPINE WO IV CONTRAST; ;  12/26/2023 8:47 am      INDICATION:  Signs/Symptoms:cervical radiculopathy.      COMPARISON:  X-ray 12/07/2023      ACCESSION NUMBER(S):  [id number]      ORDERING CLINICIAN:  JOSE LUIS HARRISON      TECHNIQUE:  Multiplanar multisequence MRI obtained of the cervical spine.      FINDINGS:  Alignment of the cervical spine demonstrates mild reversal normal  cervical lordosis. There is no listhesis. Vertebral body heights are  preserved. Disc space heights demonstrate mild loss disc space height  C5/6. Vertebral body signal demonstrates no focal marrow edema.  Spinal cord signal is unremarkable.      Evaluation of spinal levels are as follows:      C2/3 is unremarkable.      C3/4 is unremarkable.      C4/5 is unremarkable.      C5/6 demonstrates mild disc bulge with mild bilateral uncovertebral  joint hypertrophy. Subtle asymmetric component of right paracentral  disc protrusion measuring 1 mm. There is a effacement of the ventral  CSF space without flattening of the cord. No canal stenosis. Foramina  are unremarkable      C6/7 demonstrates central annulus tear. There is no disc herniation  demonstrated. No narrowing of the spinal canal or foramina. Minimal  disc bulge demonstrated.      C7/T1 is unremarkable                      Impression: 1. Mild disc bulge C5/6 with subtle asymmetric right paracentral disc  protrusion with mild effacement of the ventral CSF space. No  narrowing of the spinal canal.      2. Central annulus tear C6/7 without disc herniation.      3. No foraminal narrowing within the cervical spine.          MACRO:  None      Signed by: Dianne Brown 12/26/2023 9:28 AM  Dictation workstation:   [id number]       Activity Instructions, Restrictions, and  Accommodations:      Consultations/Referrals:  Physical therapy    Follow-up:  Follow up 6 weeks    SABINA CORRAL on 1/2/24 at 2:01 PM.     Peña Das CNP

## 2024-01-02 ENCOUNTER — OFFICE VISIT (OUTPATIENT)
Dept: SPORTS MEDICINE | Facility: CLINIC | Age: 39
End: 2024-01-02
Payer: COMMERCIAL

## 2024-01-02 VITALS
BODY MASS INDEX: 24.11 KG/M2 | WEIGHT: 150 LBS | DIASTOLIC BLOOD PRESSURE: 64 MMHG | HEIGHT: 66 IN | HEART RATE: 71 BPM | SYSTOLIC BLOOD PRESSURE: 104 MMHG

## 2024-01-02 DIAGNOSIS — M43.12 SPONDYLOLISTHESIS OF CERVICAL REGION: ICD-10-CM

## 2024-01-02 DIAGNOSIS — S16.1XXA STRAIN OF CERVICAL PORTION OF RIGHT TRAPEZIUS MUSCLE: ICD-10-CM

## 2024-01-02 DIAGNOSIS — M54.12 RADICULOPATHY OF CERVICAL REGION: ICD-10-CM

## 2024-01-02 DIAGNOSIS — M50.30 DDD (DEGENERATIVE DISC DISEASE), CERVICAL: ICD-10-CM

## 2024-01-02 DIAGNOSIS — S16.1XXD CERVICAL STRAIN, SUBSEQUENT ENCOUNTER: ICD-10-CM

## 2024-01-02 DIAGNOSIS — S46.911D RIGHT SHOULDER STRAIN, SUBSEQUENT ENCOUNTER: ICD-10-CM

## 2024-01-02 DIAGNOSIS — R29.898 NECK TIGHTNESS: ICD-10-CM

## 2024-01-02 DIAGNOSIS — M54.2 CERVICAL PAIN: ICD-10-CM

## 2024-01-02 DIAGNOSIS — M50.30 BULGING OF CERVICAL INTERVERTEBRAL DISC: ICD-10-CM

## 2024-01-02 PROCEDURE — 1036F TOBACCO NON-USER: CPT | Performed by: NURSE PRACTITIONER

## 2024-01-02 PROCEDURE — 99214 OFFICE O/P EST MOD 30 MIN: CPT | Performed by: NURSE PRACTITIONER

## 2024-01-02 ASSESSMENT — PATIENT HEALTH QUESTIONNAIRE - PHQ9
1. LITTLE INTEREST OR PLEASURE IN DOING THINGS: NOT AT ALL
SUM OF ALL RESPONSES TO PHQ9 QUESTIONS 1 AND 2: 0
2. FEELING DOWN, DEPRESSED OR HOPELESS: NOT AT ALL

## 2024-01-02 ASSESSMENT — ENCOUNTER SYMPTOMS
DEPRESSION: 0
OCCASIONAL FEELINGS OF UNSTEADINESS: 0
LOSS OF SENSATION IN FEET: 0

## 2024-01-03 ENCOUNTER — APPOINTMENT (OUTPATIENT)
Dept: PHYSICAL THERAPY | Facility: CLINIC | Age: 39
End: 2024-01-03

## 2024-01-04 ENCOUNTER — OFFICE VISIT (OUTPATIENT)
Dept: SPORTS MEDICINE | Facility: CLINIC | Age: 39
End: 2024-01-04
Payer: COMMERCIAL

## 2024-01-04 ENCOUNTER — ANCILLARY PROCEDURE (OUTPATIENT)
Dept: RADIOLOGY | Facility: CLINIC | Age: 39
End: 2024-01-04
Payer: COMMERCIAL

## 2024-01-04 VITALS
DIASTOLIC BLOOD PRESSURE: 64 MMHG | HEIGHT: 66 IN | BODY MASS INDEX: 24.11 KG/M2 | HEART RATE: 66 BPM | WEIGHT: 150 LBS | SYSTOLIC BLOOD PRESSURE: 104 MMHG

## 2024-01-04 DIAGNOSIS — M84.361A STRESS FRACTURE OF RIGHT TIBIA, INITIAL ENCOUNTER: Primary | ICD-10-CM

## 2024-01-04 DIAGNOSIS — M76.71 PERONEAL TENDINITIS OF LOWER LEG, RIGHT: ICD-10-CM

## 2024-01-04 DIAGNOSIS — M79.661 PAIN IN RIGHT LOWER LEG: ICD-10-CM

## 2024-01-04 DIAGNOSIS — S86.811A STRAIN OF RIGHT CALF MUSCLE: ICD-10-CM

## 2024-01-04 DIAGNOSIS — M84.361A STRESS FRACTURE OF RIGHT TIBIA, INITIAL ENCOUNTER: ICD-10-CM

## 2024-01-04 PROCEDURE — 99214 OFFICE O/P EST MOD 30 MIN: CPT | Performed by: NURSE PRACTITIONER

## 2024-01-04 PROCEDURE — 73590 X-RAY EXAM OF LOWER LEG: CPT | Mod: RT

## 2024-01-04 PROCEDURE — 1036F TOBACCO NON-USER: CPT | Performed by: NURSE PRACTITIONER

## 2024-01-04 ASSESSMENT — PATIENT HEALTH QUESTIONNAIRE - PHQ9
2. FEELING DOWN, DEPRESSED OR HOPELESS: NOT AT ALL
1. LITTLE INTEREST OR PLEASURE IN DOING THINGS: NOT AT ALL
SUM OF ALL RESPONSES TO PHQ9 QUESTIONS 1 AND 2: 0

## 2024-01-04 ASSESSMENT — PAIN - FUNCTIONAL ASSESSMENT: PAIN_FUNCTIONAL_ASSESSMENT: NO/DENIES PAIN

## 2024-01-04 ASSESSMENT — ENCOUNTER SYMPTOMS
RESPIRATORY NEGATIVE: 1
CARDIOVASCULAR NEGATIVE: 1
OCCASIONAL FEELINGS OF UNSTEADINESS: 0
CONSTITUTIONAL NEGATIVE: 1
DEPRESSION: 0
LOSS OF SENSATION IN FEET: 0

## 2024-01-04 NOTE — PROGRESS NOTES
Established patient  History Of Present Illness  Nehal Rodriguez is a 38 y.o. female presenting with RIGHT lower leg pain. Pt was seen for issue in 2020 by [facility] practice and was prescribed steroids but notes it never improved. It does get worse with cardio. She saw a podiatrist and had xrays completed which were negative. Pt has consulted with physical therapy and suggested it was her lower back related. Whenever she does cardio she gets calf aches in to the outer aspect of her foot. She does not wake up with foot problems. Unsure if she would describe her pain as ache or tingling. Rates current pain as a 0/10 and with cardio a 6/10 describing it as a constant ache. She also notes that if she is in the car for awhile that she will also have lower leg pain as well.  We discussed treatment options.  Patient has been seen by chiropractic as well as all of the above specialties who have never been able to figure out the cause of her pain which has been ongoing since 2020.  Upon exam patient has point tenderness and posterior tibial region concerning for occult fracture versus Periosteitis versus muscle strain/tendinopathy.  Due to ongoing nature of patient's pain with no relief despite multiple treatment modalities we will order an MRI to better evaluate for cause of pain disability.  Patient verbalizes understanding and agreement with plan of care.    Past Medical History  She has a past medical history of Cervical pain and DDD (degenerative disc disease), cervical.    Surgical History  She has no past surgical history on file.     Social History  She reports that she has never smoked. She has never used smokeless tobacco. She reports current alcohol use of about 2.0 standard drinks of alcohol per week. She reports that she does not use drugs.    Family History  Family History   Problem Relation Name Age of Onset    No Known Problems Mother      No Known Problems Father      No Known Problems Maternal  "Grandfather      Breast cancer Paternal Grandmother      Heart disease Paternal Grandmother          Allergies  Oxycodone    Review of Systems  Review of Systems   Constitutional: Negative.    Respiratory: Negative.     Cardiovascular: Negative.    All other systems reviewed and are negative.       Last Recorded Vitals  /64 (BP Location: Right arm, Patient Position: Sitting, BP Cuff Size: Adult)   Pulse 66   Ht 1.676 m (5' 6\")   Wt 68 kg (150 lb)   BMI 24.21 kg/m²      Examination:  Right   Erythema: Negative.   Edema: Negative.  Effusion: Negative.   Warmth: Negative.   Ecchymosis/Bruising: Negative.  Percussion Test: Positive    Tuning Fork Test: Positive   Abrasions: Negative.            Orientation: symmetrical           ROM: Positive  FROM, pain with movement.            Muscle Strength:   Positive   +3/+5 Plantar Flexion, Decreased due to pain  +3/+5 Dorsi Flexion, Decreased due to pain        +5/+5 Inversion  +5/+5 Eversion          +5/+5 Plantarflexion in combination with inversion  +5/+5 Plantarflexion in combination with eversion        +3/+5 Dorsiflexion in combination with inversion (Posterior Tibialis)  +3/+5 Dorsiflexion in combination with eversion (Peroneal Brevis)  +3/+5 Dorsiflexion in combination with eversion and flexion of great toe (Peroneal Longus).            DTR/Neurological:   Sensation Intact, 2 Point Discrimination Test: Negative  +2/+4 Achilles Tendon Reflex (S1).     Sensation/Neurological:   Sensation Intact, 2 Point Discrimination Test: Negative  L3: Low back and front of upper leg/thigh  L4: Low back, front of upper leg/thigh, front of lower leg/calf, front of medial area of knee, and inside of ankle  L5: Low back, front and lateral knee, front and outside of lower leg/calf, top and bottom of foot, and first four toes especially big toe  S1: low back, back of upper leg/thigh, back and inside of lower leg, calf and little toe  S2: Buttocks, genitals, back of upper leg/thigh, " and calves  S3: Buttocks and genitals.            Palpation: Positive Tenderness to Palpation over Tibia/fibula, gastroc muscle, lateral aspect of foot.            Vascular:   +2/+4 Dorsalis Pedis  +2/+4 Posterior Tibialis  Capillary Refill < 2 Seconds.     Leg/Ankle/Foot - Ankle Impingement:  Posterior Ankle Impingement Test: Negative.   Anterior Ankle Impingement Test: Negative.            Leg/Ankle/Foot - Ankle Instability:  Flexibility Test:  Positive   Anterior Drawer Test:  Negative.   Talar Tilt Test:  Negative.   External Rotation Kleiger Plantar Flexion Test:  Negative.   External Rotation Kleiger Dorsiflexion Test:  Negative.   Squeeze Test:  Negative.   Dorsiflexion Test: Negative.   Posterior Tibial or Peroneal Tendon Instability Test:  Negative.   Horizontal Squeeze Test:  Negative.   Vertical Squeeze Test:  Negative.   Standing/Walking Test:  Negative.   Proprioception Test:  Negative.   Hop Test:  Negative.         Leg/Ankle/Foot - DVT:  Kendrick's Sign: Negative.            Leg/Ankle/Foot - Forefoot:  Strunsky Test:  Negative.   Gaenslen Maneuver Test:  Negative.   Metatarsal Tap Test:  Negative.   Crepitation Test:  Negative.            Leg/Ankle/Foot - Hindfoot Achilles/Calcaneus:  Ku Compression Test: Negative.   Hoffa Sign: Negative.   Achilles Tendon Tap Test: Negative.   Heel Compression Test: Negative.         Leg/Ankle/Foot - Nerve Irritation:  Miryam Sign: Negative.   Digital Nerve Stretch Test: Negative.   Tinel Sign: Negative.   Tourniquet Sign: Negative.            Hip/Pelvis - Sacrum:  Leg Length Supine: Positive LEFT leg shorter than the Right    Leg Length Supine to Seated (Derbolowsky Sign): Positive LEFT leg shorter than the Right   Standing Flexion Test: Negative  Seated Flexion Test: Negative  Spring Test: Negative   Sacral Somatic Dysfunction: Negative  Hip Flexor Tightness: Positive   Hamstring Tightness: Positive           Feet/Foot: Positive   Valgus foot     Imaging and  Diagnostics Review:  XRAYs ordered during today's visit    Assessment   1. Pain in right lower leg    2. Strain of right calf muscle    3. Peroneal tendinitis of lower leg, right    4. Stress fracture of right tibia, initial encounter        Plan   Treatment or Intervention:  May continue PRICE therapy as needed,   Start into Physical Therapy 1-2 times a week for 8-10 weeks with manual therapy as well as dry needling and IASTM    Stressed the importance of wearing shoes with good stability control to help with the biomechanics affecting the lower extremities    Stressed the importance of wearing full foot insoles to help with the biomechanics affecting the lower extremities    Recommendation over-the-counter calcium with vitamin-D 2 -3000+ milligrams a day, as well as OTC symphytum as directed daily to promote bony healing, in addition to a daily multivitamin.,   Recommendation over-the-counter Move Free for joint health.    May take the following: Ibuprofen may alternate with OTC Tylenol Extra Strength or OTC Tylenol Arthritis, taking one every 6-8 hours with food as needed.   Patient advised regarding the risks and/or potential adverse reactions and/or side effects of any prescribed medications along with any over-the-counter medications or any supplements used. Patient advised to seek immediate medical care if any adverse reactions occur. The patient and/or patient(s) parent(s) verbalized their understanding '  MRI of the RIGHT tibia and fibula and/or foot/fee to rule out ligament or tendon injury and to further evaluate for stress fracture of triple phase bone scan to rule out stress fracture of the lower leg and/or foot    Discussed in detail with the patient to the level of their understanding the possibility in the future of regenerative injections versus corticosteroid injections      Diagnostic studies:  Interpreted By:  Lakshmi Bradshaw,   STUDY:  XR TIBIA FIBULA RIGHT 2 VIEWS 1/4/2024 12:21 pm       INDICATION:  Signs/Symptoms:RIGHT LOWER LEG PAIN with strain of right calf muscle.  Peroneal tendonitis.      COMPARISON:  None available.      ACCESSION NUMBER(S):  [id number]      ORDERING CLINICIAN:  JOSE LUIS DAS      TECHNIQUE:  2 weight-bearing views of right tibia and fibula in AP and lateral  projections      FINDINGS:  No fracture, dislocation or bony abnormality with the regional soft  tissues unremarkable.      IMPRESSION:  Negative exam.      Signed by: Lakshmi Bradshaw 1/4/2024 12:59 PM  Dictation workstation:   [id number]  Consultations/Referrals:  Physical therapy    Follow-up:  Follow up after MRI of RIGHT tib/fib    SABINA CORRAL on 1/4/24 at 11:54 AM.     Jose Luis Das CNP

## 2024-01-04 NOTE — PATIENT INSTRUCTIONS
May continue PRICE therapy as needed,   Start into Physical Therapy 1-2 times a week for 8-10 weeks with manual therapy as well as dry needling and IASTM    Stressed the importance of wearing shoes with good stability control to help with the biomechanics affecting the lower extremities    Stressed the importance of wearing full foot insoles to help with the biomechanics affecting the lower extremities    Recommendation over-the-counter calcium with vitamin-D 2 -3000+ milligrams a day, as well as OTC symphytum as directed daily to promote bony healing, in addition to a daily multivitamin.,   Recommendation over-the-counter Move Free for joint health.    May take the following: Ibuprofen may alternate with OTC Tylenol Extra Strength or OTC Tylenol Arthritis, taking one every 6-8 hours with food as needed.   Patient advised regarding the risks and/or potential adverse reactions and/or side effects of any prescribed medications along with any over-the-counter medications or any supplements used. Patient advised to seek immediate medical care if any adverse reactions occur. The patient and/or patient(s) parent(s) verbalized their understanding '  MRI of the RIGHT tibia and fibula and/or foot/fee to rule out ligament or tendon injury and to further evaluate for stress fracture of triple phase bone scan to rule out stress fracture of the lower leg and/or foot    Discussed in detail with the patient to the level of their understanding the possibility in the future of regenerative injections versus corticosteroid injections    Follow up AFTER mri of RIGHT tib/fib

## 2024-01-05 ENCOUNTER — APPOINTMENT (OUTPATIENT)
Dept: PHYSICAL THERAPY | Facility: CLINIC | Age: 39
End: 2024-01-05

## 2024-01-08 ENCOUNTER — APPOINTMENT (OUTPATIENT)
Dept: PHYSICAL THERAPY | Facility: CLINIC | Age: 39
End: 2024-01-08

## 2024-01-10 ENCOUNTER — APPOINTMENT (OUTPATIENT)
Dept: PHYSICAL THERAPY | Facility: CLINIC | Age: 39
End: 2024-01-10

## 2024-01-10 DIAGNOSIS — M43.12 SPONDYLOLISTHESIS OF CERVICAL REGION: Primary | ICD-10-CM

## 2024-01-10 RX ORDER — DICLOFENAC POTASSIUM 50 MG/1
50 TABLET, FILM COATED ORAL 3 TIMES DAILY
Qty: 42 TABLET | Refills: 0 | Status: SHIPPED | OUTPATIENT
Start: 2024-01-10 | End: 2024-03-07

## 2024-01-10 NOTE — PROGRESS NOTES
Left a telephone message stating that her cervical pain is worsening and not responding to over-the-counter medications.  Sent in a prescription for diclofenac and placed referral to pain management as requested by patient.  Patient can follow-up as needed for further concerns.

## 2024-01-15 ENCOUNTER — APPOINTMENT (OUTPATIENT)
Dept: PHYSICAL THERAPY | Facility: CLINIC | Age: 39
End: 2024-01-15

## 2024-01-16 ENCOUNTER — APPOINTMENT (OUTPATIENT)
Dept: PHYSICAL THERAPY | Facility: CLINIC | Age: 39
End: 2024-01-16
Payer: COMMERCIAL

## 2024-01-17 DIAGNOSIS — F41.1 GENERALIZED ANXIETY DISORDER: ICD-10-CM

## 2024-01-17 RX ORDER — SERTRALINE HYDROCHLORIDE 50 MG/1
50 TABLET, FILM COATED ORAL DAILY
Qty: 90 TABLET | Refills: 1 | Status: SHIPPED | OUTPATIENT
Start: 2024-01-17

## 2024-01-19 ENCOUNTER — APPOINTMENT (OUTPATIENT)
Dept: PHYSICAL THERAPY | Facility: CLINIC | Age: 39
End: 2024-01-19

## 2024-01-19 ENCOUNTER — APPOINTMENT (OUTPATIENT)
Dept: PHYSICAL THERAPY | Facility: CLINIC | Age: 39
End: 2024-01-19
Payer: COMMERCIAL

## 2024-01-22 ENCOUNTER — APPOINTMENT (OUTPATIENT)
Dept: PAIN MEDICINE | Facility: CLINIC | Age: 39
End: 2024-01-22
Payer: COMMERCIAL

## 2024-01-22 ENCOUNTER — HOSPITAL ENCOUNTER (OUTPATIENT)
Dept: RADIOLOGY | Facility: HOSPITAL | Age: 39
Discharge: HOME | End: 2024-01-22
Payer: COMMERCIAL

## 2024-01-22 DIAGNOSIS — M79.661 PAIN IN RIGHT LOWER LEG: ICD-10-CM

## 2024-01-22 DIAGNOSIS — S86.811A STRAIN OF RIGHT CALF MUSCLE: ICD-10-CM

## 2024-01-22 DIAGNOSIS — M84.361A STRESS FRACTURE OF RIGHT TIBIA, INITIAL ENCOUNTER: ICD-10-CM

## 2024-01-22 DIAGNOSIS — M76.71 PERONEAL TENDINITIS OF LOWER LEG, RIGHT: ICD-10-CM

## 2024-01-22 PROCEDURE — 73718 MRI LOWER EXTREMITY W/O DYE: CPT | Mod: RT

## 2024-01-23 ENCOUNTER — APPOINTMENT (OUTPATIENT)
Dept: PHYSICAL THERAPY | Facility: CLINIC | Age: 39
End: 2024-01-23
Payer: COMMERCIAL

## 2024-01-24 ENCOUNTER — OFFICE VISIT (OUTPATIENT)
Dept: SPORTS MEDICINE | Facility: CLINIC | Age: 39
End: 2024-01-24
Payer: COMMERCIAL

## 2024-01-24 VITALS
BODY MASS INDEX: 24.11 KG/M2 | HEIGHT: 66 IN | DIASTOLIC BLOOD PRESSURE: 76 MMHG | HEART RATE: 68 BPM | SYSTOLIC BLOOD PRESSURE: 116 MMHG | WEIGHT: 150 LBS

## 2024-01-24 DIAGNOSIS — M76.71 PERONEAL TENDINITIS OF LOWER LEG, RIGHT: ICD-10-CM

## 2024-01-24 DIAGNOSIS — M79.661 PAIN IN RIGHT LOWER LEG: ICD-10-CM

## 2024-01-24 DIAGNOSIS — S86.111D GASTROCNEMIUS STRAIN, RIGHT, SUBSEQUENT ENCOUNTER: ICD-10-CM

## 2024-01-24 DIAGNOSIS — S86.811A STRAIN OF RIGHT CALF MUSCLE: ICD-10-CM

## 2024-01-24 PROCEDURE — 99214 OFFICE O/P EST MOD 30 MIN: CPT | Performed by: NURSE PRACTITIONER

## 2024-01-24 PROCEDURE — 1036F TOBACCO NON-USER: CPT | Performed by: NURSE PRACTITIONER

## 2024-01-24 ASSESSMENT — PATIENT HEALTH QUESTIONNAIRE - PHQ9
1. LITTLE INTEREST OR PLEASURE IN DOING THINGS: NOT AT ALL
2. FEELING DOWN, DEPRESSED OR HOPELESS: NOT AT ALL
SUM OF ALL RESPONSES TO PHQ9 QUESTIONS 1 AND 2: 0

## 2024-01-24 ASSESSMENT — ENCOUNTER SYMPTOMS
LOSS OF SENSATION IN FEET: 0
DEPRESSION: 0
RESPIRATORY NEGATIVE: 1
CARDIOVASCULAR NEGATIVE: 1
MYALGIAS: 1
CONSTITUTIONAL NEGATIVE: 1
ARTHRALGIAS: 1
OCCASIONAL FEELINGS OF UNSTEADINESS: 0

## 2024-01-24 NOTE — PROGRESS NOTES
"Established patient  History Of Present Illness  Nehal Rodriguez is a 38 y.o. female presenting for her MRI follow up of her RIGHT tib/fib. Stopped PT and focusing more on pain management at the moment. At the moment she states she feels good with no pain.  We reviewed patient MRI results in detail.  Patient verbalizes understanding of results.  We discussed treatment options and patient will start physical therapy as previously recommended.  Patient has not started physical therapy at this point due to concerns about constraints of time and whether or not it is truly necessary.  Educated patient that she has not been improving with her previous treatment of rest and physical therapy is required to reach goals.  Patient will start physical therapy and can follow-up in 6 weeks for reevaluation or sooner if needed.  Patient verbalizes understanding and agreement with plan of care.    Past Medical History  She has a past medical history of Cervical pain and DDD (degenerative disc disease), cervical.    Surgical History  She has no past surgical history on file.     Social History  She reports that she has never smoked. She has never used smokeless tobacco. She reports current alcohol use of about 2.0 standard drinks of alcohol per week. She reports that she does not use drugs.    Family History  Family History   Problem Relation Name Age of Onset    No Known Problems Mother      No Known Problems Father      No Known Problems Maternal Grandfather      Breast cancer Paternal Grandmother      Heart disease Paternal Grandmother          Allergies  Oxycodone    Review of Systems  Review of Systems   Constitutional: Negative.    Respiratory: Negative.     Cardiovascular: Negative.    Musculoskeletal:  Positive for arthralgias and myalgias.   All other systems reviewed and are negative.       Last Recorded Vitals  /76   Pulse 68   Ht 1.676 m (5' 6\")   Wt 68 kg (150 lb)   LMP 12/22/2023 (Exact Date)   BMI 24.21 " kg/m²      Examination:  Right   Erythema: Negative.   Edema: Negative.  Effusion: Negative.   Warmth: Negative.   Ecchymosis/Bruising: Negative.  Percussion Test: Negative.   Tuning Fork Test: Negative.  Abrasions: Negative.            Orientation: symmetrical           ROM: Positive  FROM, pain with movement.            Muscle Strength:   Positive   +4/+5 Plantar Flexion, Decreased due to pain  +4/+5 Dorsi Flexion, Decreased due to pain        +5/+5 Inversion  +5/+5 Eversion          +5/+5 Plantarflexion in combination with inversion  +5/+5 Plantarflexion in combination with eversion        +3/+5 Dorsiflexion in combination with inversion (Posterior Tibialis)  +3/+5 Dorsiflexion in combination with eversion (Peroneal Brevis)  +3/+5 Dorsiflexion in combination with eversion and flexion of great toe (Peroneal Longus).            DTR/Neurological:   Sensation Intact, 2 Point Discrimination Test: Negative  +2/+4 Achilles Tendon Reflex (S1).      Sensation/Neurological:   Sensation Intact, 2 Point Discrimination Test: Negative  L3: Low back and front of upper leg/thigh  L4: Low back, front of upper leg/thigh, front of lower leg/calf, front of medial area of knee, and inside of ankle  L5: Low back, front and lateral knee, front and outside of lower leg/calf, top and bottom of foot, and first four toes especially big toe  S1: low back, back of upper leg/thigh, back and inside of lower leg, calf and little toe  S2: Buttocks, genitals, back of upper leg/thigh, and calves  S3: Buttocks and genitals.            Palpation: Positive Tenderness to Palpation over Tibia/fibula, gastroc muscle, lateral aspect of foot.            Vascular:   +2/+4 Dorsalis Pedis  +2/+4 Posterior Tibialis  Capillary Refill < 2 Seconds.      Leg/Ankle/Foot - Ankle Impingement:  Posterior Ankle Impingement Test: Negative.   Anterior Ankle Impingement Test: Negative.            Leg/Ankle/Foot - Ankle Instability:  Flexibility Test:  Positive   Anterior  Drawer Test:  Negative.   Talar Tilt Test:  Negative.   External Rotation Kleiger Plantar Flexion Test:  Negative.   External Rotation Kleiger Dorsiflexion Test:  Negative.   Squeeze Test:  Negative.   Dorsiflexion Test: Negative.   Posterior Tibial or Peroneal Tendon Instability Test:  Negative.   Horizontal Squeeze Test:  Negative.   Vertical Squeeze Test:  Negative.   Standing/Walking Test:  Negative.   Proprioception Test:  Negative.   Hop Test:  Negative.          Leg/Ankle/Foot - DVT:  Kendrick's Sign: Negative.            Leg/Ankle/Foot - Forefoot:  Strunsky Test:  Negative.   Gaenslen Maneuver Test:  Negative.   Metatarsal Tap Test:  Negative.   Crepitation Test:  Negative.            Leg/Ankle/Foot - Hindfoot Achilles/Calcaneus:  Ku Compression Test: Negative.   Hoffa Sign: Negative.   Achilles Tendon Tap Test: Negative.   Heel Compression Test: Negative.          Leg/Ankle/Foot - Nerve Irritation:  Miryam Sign: Negative.   Digital Nerve Stretch Test: Negative.   Tinel Sign: Negative.   Tourniquet Sign: Negative.            Hip/Pelvis - Sacrum:  Leg Length Supine: Positive LEFT leg shorter than the Right    Leg Length Supine to Seated (Derbolowsky Sign): Positive LEFT leg shorter than the Right   Standing Flexion Test: Negative  Seated Flexion Test: Negative  Spring Test: Negative   Sacral Somatic Dysfunction: Negative  Hip Flexor Tightness: Positive   Hamstring Tightness: Positive           Feet/Foot: Positive   Valgus foot     Imaging and Diagnostics Review:  No new radiographs were obtained today.    Assessment   1. Pain in right lower leg    2. Strain of right calf muscle    3. Peroneal tendinitis of lower leg, right    4. Gastrocnemius strain, right, subsequent encounter        Plan   Treatment or Intervention:  May continue PRICE therapy as needed,   Stressed the importance to start into Physical Therapy 1-2 times a week for 8-10 weeks with manual therapy as well as dry needling and IASTM    Again  stressed the importance of wearing shoes with good stability control to help with the biomechanics affecting the lower extremities    Again stressed the importance of wearing full foot insoles to help with the biomechanics affecting the lower extremities    Recommendation over-the-counter calcium with vitamin-D 2 -3000+ milligrams a day, as well as OTC symphytum as directed daily to promote bony healing, in addition to a daily multivitamin.,   Recommendation over-the-counter Move Free for joint health.    May take the following: Ibuprofen may alternate with OTC Tylenol Extra Strength or OTC Tylenol Arthritis, taking one every 6-8 hours with food as needed.   Patient advised regarding the risks and/or potential adverse reactions and/or side effects of any prescribed medications along with any over-the-counter medications or any supplements used. Patient advised to seek immediate medical care if any adverse reactions occur. The patient and/or patient(s) parent(s) verbalized their understanding '  Reviewed RIGHT tib/fib MRI in detail with the patient and/or patients parent/legal guardian to their level of understanding; a copy of these results were provided to the patient and/or patients parent/legal guardian at the time of this office visit.   Discussed in detail with the patient to the level of their understanding the possibility in the future of regenerative injections versus corticosteroid injections    Diagnostic studies:  MR tibia fibula right wo IV contrast  Narrative: Interpreted By:  Dianne Brown,     STUDY: MR TIBIA FIBULA RIGHT WO IV CONTRAST; ;  1/22/2024 8:42 am      INDICATION: Signs/Symptoms:RIGHT LOWER LEG PAIN. strain peroneal tendinitis stress fracture persistent pain for several months      COMPARISON: X-ray 01/04/2024      ACCESSION NUMBER(S): [id number]      ORDERING CLINICIAN: JOSE LUIS HARRISON      TECHNIQUE: Multiplanar multisequence MRI obtained of the right tibia/fibula.       FINDINGS:  Right tibia/fibula demonstrate normal marrow signal intensity. No cortical irregularity. No endosteal marrow edema. There is no periosteal edema demonstrated. Mild pretibial subcutaneous edema.  Fibula demonstrates normal marrow signal intensity. No cortical irregularity. No stress reaction or stress fracture within the right tibia/fibula.      Musculature within the compartments of the right calf in particular superficial posterior compartment of the right calf demonstrates mild feathery appearing interstitial muscle edema within the medial and  lateral head of the gastrocnemius in particular mid to distal muscle belly no intramuscular fluid collection or focal myotendinous tear.    No hematoma formation within the visualized musculature. The distal gastrocnemius myotendinous junction is otherwise unremarkable.    Contribution to the Achilles tendon complex is otherwise unremarkable. There is mild fluid in the inter fascial plane deep to the medial head of the gastrocnemius about the soleus muscle. The  corresponding soleus muscle demonstrates no focal muscle strain or myotendinous edema. The plantaris tendon visualized portions is unremarkable remainder of the musculature of the superficial posterior compartment demonstrates only minimal feathery appearing interstitial muscle edema along the anterior margin of the soleus muscle. Deep posterior, anterior, and lateral compartments of the right calf are otherwise unremarkable. Mild subcutaneous edema demonstrated in the right calf.      Achilles tendon visualized portions unremarkable.      Included portions visualized right knee joint demonstrates no joint effusion. No ankle joint effusion visualized portions.      Visualized portions of the left calf demonstrates symmetric appearance of feathery appearing interstitial muscle edema in the medial and lateral heads of the gastrocnemius muscle with mild edema in the inter fascial plane superficial to the  soleus muscle with similar configuration appearance with the right calf. No stress reaction or stress fracture within the visualized portions of the left tibia/fibula.      Impression:   1. Muscle strain of the medial and lateral gastrocnemius musculature with mild feathery appearing interstitial muscle edema without  myotendinous tear or intramuscular hematoma formation. There is mild edema deep to the muscle belly in the inter fascial plane with the  soleus muscle. No distal medial head gastrocnemius or soleus myotendinous tear.      2. No stress reaction or stress fracture within the right tibia/fibula.        MACRO:  None      Signed by: Dianne Brown 1/22/2024 12:28 PM  Dictation workstation:   [id number]       Activity Instructions, Restrictions, and Accommodations:      Consultations/Referrals:  Physical therapy    Follow-up:      YORDAN VARGAS on 1/24/24 at 10:44 AM.     Peña Das CNP

## 2024-01-24 NOTE — PATIENT INSTRUCTIONS
Treatment or Intervention:  May continue PRICE therapy as needed,   Stressed the importance to start into Physical Therapy 1-2 times a week for 8-10 weeks with manual therapy as well as dry needling and IASTM    Again stressed the importance of wearing shoes with good stability control to help with the biomechanics affecting the lower extremities    Again stressed the importance of wearing full foot insoles to help with the biomechanics affecting the lower extremities    Recommendation over-the-counter calcium with vitamin-D 2 -3000+ milligrams a day, as well as OTC symphytum as directed daily to promote bony healing, in addition to a daily multivitamin.,   Recommendation over-the-counter Move Free for joint health.    May take the following: Ibuprofen may alternate with OTC Tylenol Extra Strength or OTC Tylenol Arthritis, taking one every 6-8 hours with food as needed.   Patient advised regarding the risks and/or potential adverse reactions and/or side effects of any prescribed medications along with any over-the-counter medications or any supplements used. Patient advised to seek immediate medical care if any adverse reactions occur. The patient and/or patient(s) parent(s) verbalized their understanding '  Reviewed RIGHT tib/fib MRI in detail with the patient and/or patients parent/legal guardian to their level of understanding; a copy of these results were provided to the patient and/or patients parent/legal guardian at the time of this office visit.   Discussed in detail with the patient to the level of their understanding the possibility in the future of regenerative injections versus corticosteroid injections  Follow up 6-8 weeks

## 2024-01-26 ENCOUNTER — APPOINTMENT (OUTPATIENT)
Dept: PHYSICAL THERAPY | Facility: CLINIC | Age: 39
End: 2024-01-26

## 2024-01-26 ENCOUNTER — APPOINTMENT (OUTPATIENT)
Dept: PHYSICAL THERAPY | Facility: CLINIC | Age: 39
End: 2024-01-26
Payer: COMMERCIAL

## 2024-01-29 ENCOUNTER — APPOINTMENT (OUTPATIENT)
Dept: PAIN MEDICINE | Facility: CLINIC | Age: 39
End: 2024-01-29
Payer: COMMERCIAL

## 2024-01-31 ENCOUNTER — APPOINTMENT (OUTPATIENT)
Dept: PHYSICAL THERAPY | Facility: CLINIC | Age: 39
End: 2024-01-31
Payer: COMMERCIAL

## 2024-01-31 ENCOUNTER — OFFICE VISIT (OUTPATIENT)
Dept: PAIN MEDICINE | Facility: CLINIC | Age: 39
End: 2024-01-31
Payer: COMMERCIAL

## 2024-01-31 ENCOUNTER — APPOINTMENT (OUTPATIENT)
Dept: PHYSICAL THERAPY | Facility: CLINIC | Age: 39
End: 2024-01-31

## 2024-01-31 VITALS
HEIGHT: 66 IN | RESPIRATION RATE: 16 BRPM | BODY MASS INDEX: 24.11 KG/M2 | DIASTOLIC BLOOD PRESSURE: 71 MMHG | HEART RATE: 61 BPM | SYSTOLIC BLOOD PRESSURE: 108 MMHG | WEIGHT: 150 LBS

## 2024-01-31 DIAGNOSIS — M54.12 CERVICAL RADICULOPATHY: ICD-10-CM

## 2024-01-31 DIAGNOSIS — S86.811A STRAIN OF RIGHT CALF MUSCLE: ICD-10-CM

## 2024-01-31 DIAGNOSIS — M54.16 LUMBAR RADICULITIS: Primary | ICD-10-CM

## 2024-01-31 PROCEDURE — 99204 OFFICE O/P NEW MOD 45 MIN: CPT | Performed by: ANESTHESIOLOGY

## 2024-01-31 PROCEDURE — 99214 OFFICE O/P EST MOD 30 MIN: CPT | Performed by: ANESTHESIOLOGY

## 2024-01-31 PROCEDURE — 1036F TOBACCO NON-USER: CPT | Performed by: ANESTHESIOLOGY

## 2024-01-31 ASSESSMENT — ENCOUNTER SYMPTOMS
HEMATOLOGIC/LYMPHATIC NEGATIVE: 1
NEUROLOGICAL NEGATIVE: 1
RESPIRATORY NEGATIVE: 1
NECK PAIN: 1
BACK PAIN: 1
EYES NEGATIVE: 1
ENDOCRINE NEGATIVE: 1
GASTROINTESTINAL NEGATIVE: 1
CONSTITUTIONAL NEGATIVE: 1
PSYCHIATRIC NEGATIVE: 1
MYALGIAS: 1
CARDIOVASCULAR NEGATIVE: 1

## 2024-01-31 ASSESSMENT — PAIN SCALES - GENERAL
PAINLEVEL: 0-NO PAIN
PAINLEVEL_OUTOF10: 0 - NO PAIN

## 2024-01-31 ASSESSMENT — PAIN DESCRIPTION - DESCRIPTORS: DESCRIPTORS: BURNING

## 2024-01-31 ASSESSMENT — LIFESTYLE VARIABLES: TOTAL SCORE: 1

## 2024-01-31 ASSESSMENT — PAIN - FUNCTIONAL ASSESSMENT: PAIN_FUNCTIONAL_ASSESSMENT: 0-10

## 2024-01-31 NOTE — LETTER
January 31, 2024     Peña Das, CARYN-[facility]  [address]  [place] [address]    Patient: Nehal Rodriguez   YOB: 1985   Date of Visit: 1/31/2024       Dear Dr. Peña Das, APRTHOMAS-CNP:    Thank you for referring Nehal Rodriguez to me for evaluation. Below are my notes for this consultation.  If you have questions, please do not hesitate to call me. I look forward to following your patient along with you.       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    Pt describes LB tenderness and pain down her RLE since 2020. Pt associates pain with walking on incline treadmill and pain is worse with any activity since. Pain is better with rest. Pt has tried Tylenol, NSAIDs, PT, >6 w home exercise tx, cyclobenzaprine.    Pt also describes neck pain >10 y without inciting trauma/incident. Pain is righ-sided and radiates to shoulder. Pain is worse with work and sitting at computer. Pt has tried Tylenol, NSAIDs, PT, >6 w home exercise tx, cyclobenzaprine.    Pt denies new-onset numbness, weakness, bowel/bladder incontinence.  Pt denies recent infection, allergy to Latex/iodine/contrast. Patient is currently taking the following blood thinner(s): N/A    REVIEW OF SYSTEMS  Review of Systems   Constitutional: Negative.    HENT: Negative.     Eyes: Negative.    Respiratory: Negative.     Cardiovascular: Negative.    Gastrointestinal: Negative.    Endocrine: Negative.    Musculoskeletal:  Positive for back pain, myalgias and neck pain.   Skin: Negative.    Neurological: Negative.    Hematological: Negative.   "  Psychiatric/Behavioral: Negative.         PAST MEDICAL HISTORY  Past Medical History:   Diagnosis Date   • Cervical pain    • DDD (degenerative disc disease), cervical      History reviewed. No pertinent surgical history.  Family History   Problem Relation Name Age of Onset   • No Known Problems Mother     • No Known Problems Father     • No Known Problems Maternal Grandfather     • Breast cancer Paternal Grandmother     • Heart disease Paternal Grandmother         CURRENT MEDICATIONS  Current Outpatient Medications   Medication Sig Dispense Refill   • cyclobenzaprine (Flexeril) 5 mg tablet Take 1 -2 tablets at night (Patient not taking: Reported on 1/31/2024) 30 tablet 0   • diclofenac (Cataflam) 50 mg tablet Take 1 tablet (50 mg) by mouth 3 times a day for 14 days. 42 tablet 0   • hydrOXYzine HCL (Atarax) 25 mg tablet Take 1 tablet (25 mg) by mouth once daily at bedtime.     • omeprazole (PriLOSEC) 10 mg DR capsule Take 1 capsule (10 mg) by mouth once daily in the morning. Take before meals.     • sertraline (Zoloft) 50 mg tablet TAKE ONE TABLET BY MOUTH EVERY DAY 90 tablet 1   • valACYclovir (Valtrex) 1 gram tablet Take 1 tablet (1,000 mg) by mouth 2 times a day. Take 2 tabs (2000 mg) 2 times a days for 1 day. 4 tablet 1     No current facility-administered medications for this visit.       ALLERGIES AND DRUG REACTIONS  Allergies   Allergen Reactions   • Oxycodone Unknown          OBJECTIVE  Visit Vitals  /71   Pulse 61   Resp 16   Ht 1.676 m (5' 6\")   Wt 68 kg (150 lb)   LMP 12/22/2023 (Exact Date)   BMI 24.21 kg/m²   OB Status Having periods   Smoking Status Never   BSA 1.78 m²       Last Recorded Pain Score (if available):                Physical Exam  General: Sitting in chair, NAD  Head: NCAT  Eyes: Sclera/conjunctiva clear, EOMI, PERRL  Nose/mouth: MMM  CV: Good distal pulses  Lungs: Good/equal chest excursion  Abdomen: Soft, ND  Ext: No cyanosis/edema  MSK: c-spine alignment: WNL, R paraspinal m " TTP, c-spine ROM: full. Neg Spurling, LHermitte    L-spine alignment: WNL, BL paraspinal m TTP, L-spine ROM: full  Neuro: AAOx3   Dermatome sensation to light touch  LEFT C5: WNL    RIGHT C5: WNL      LEFT C6: WNL       RIGHT C6: WNL      LEFT C7: WNL       RIGHT C7: WNL      LEFT C8: WNL       RIGHT C8: WNL      LEFT T1: WNL       RIGHT T1: WNL    LEFT L1 (lower pelvis/upper thigh): WNL    RIGHT L1: WNL      LEFT L2 (upper thigh): WNL       RIGHT: L2:WNL      LEFT L3 (medial knee): WNL       RIGHT L3: WNL      LEFT L4 (superior medial malleolus): WNL       RIGHT L4: WNL      LEFT L5 (dorsal foot): WNL       RIGHT L5: WNL      LEFT S1 (lateral foot): WNL     RIGHT S1: WNL      LEFT S2 (popliteal fossa): WNL    RIGHT S2: WNL        Motor strength  LEFT C5 (elbow flexion): 5/5   RIGHT C5: 5/5  LEFT C6 (wrist extension): 5/5     RIGHT C6: 5/5  LEFT C7 (elbow extension): 5/5     RIGHT C7: 5/5  LEFT C8 (finger abduction): 5/5     RIGHT C8: 5/5  LEFT T1 (hand ): 5/5     RIGHT T1: 5/5    LEFT L2 (hip flexion): 5/5   RIGHT L2: 5/5  LEFT L3 (knee extension): 5/5     RIGHT L3: 5/5  LEFT L4 (dorsiflexion): 5/5     RIGHT L4: 5/5  LEFT L5 (EHL extension): 5/5     RIGHT L5: 5/5  LEFT S1 (plantarflexion): 5/5     RIGHT S1: 5/5  LEFT S2 (knee flexion): 5/5      RIGHT S2: 5/5    Special testing  Pettit: neg BL  DTR unremarkable  Seated slump test neg BL  Clonus: neg BL  Babinski: neg BL    Psych: affect nl  Skin: no rash/lesions      REVIEW OF LABORATORY DATA  I have reviewed the following lab results:  WBC   Date Value Ref Range Status   08/31/2023 7.4 4.5 - 11.0 K/UL Final     RBC   Date Value Ref Range Status   08/31/2023 3.38 (L) 4.0 - 4.9 M/UL Final     Hemoglobin   Date Value Ref Range Status   08/31/2023 10.6 (L) 12.0 - 15.0 GM/DL Final     Hematocrit   Date Value Ref Range Status   08/31/2023 31.4 (L) 36 - 44 % Final     MCV   Date Value Ref Range Status   08/31/2023 92.9 80 - 100 FL Final     MCH   Date Value Ref Range  "Status   08/31/2023 31.4 26 - 34 PG Final     MCHC   Date Value Ref Range Status   08/31/2023 33.8 31 - 37 % Final     Platelets   Date Value Ref Range Status   08/31/2023 256 150 - 450 K/UL Final     MPV   Date Value Ref Range Status   08/31/2023 9.8 7.0 - 12.6 CU Final     Sodium   Date Value Ref Range Status   08/31/2023 143 133 - 145 MMOL/L Final     Potassium   Date Value Ref Range Status   08/31/2023 3.7 3.4 - 5.1 MMOL/L Final     Bicarbonate   Date Value Ref Range Status   08/31/2023 22 (L) 24 - 31 MMOL/L Final     Urea Nitrogen   Date Value Ref Range Status   08/31/2023 5 (L) 8 - 25 MG/DL Final     Calcium   Date Value Ref Range Status   08/31/2023 8.5 8.5 - 10.4 MG/DL Final     No results found for: \"PROTIME\", \"PTT\", \"INR\", \"FIBRINOGEN\"      REVIEW OF RADIOLOGY   I have reviewed the following:  Radiology Studies           MRI R tib/fib 1/4/24:  1. Muscle strain of the medial and lateral gastrocnemius musculature  with mild feathery appearing interstitial muscle edema without  myotendinous tear or intramuscular hematoma formation. There is mild  edema deep to the muscle belly in the inter fascial plane with the  soleus muscle. No distal medial head gastrocnemius or soleus  myotendinous tear.      2. No stress reaction or stress fracture within the right  tibia/fibula.        MRI c-spine 12/26/23:  Alignment of the cervical spine demonstrates mild reversal normal  cervical lordosis. There is no listhesis. Vertebral body heights are  preserved. Disc space heights demonstrate mild loss disc space height  C5/6. Vertebral body signal demonstrates no focal marrow edema.  Spinal cord signal is unremarkable.      Evaluation of spinal levels are as follows:      C2/3 is unremarkable.      C3/4 is unremarkable.      C4/5 is unremarkable.      C5/6 demonstrates mild disc bulge with mild bilateral uncovertebral  joint hypertrophy. Subtle asymmetric component of right paracentral  disc protrusion measuring 1 mm. There is " a effacement of the ventral  CSF space without flattening of the cord. No canal stenosis. Foramina  are unremarkable      C6/7 demonstrates central annulus tear. There is no disc herniation  demonstrated. No narrowing of the spinal canal or foramina. Minimal  disc bulge demonstrated.      C7/T1 is unremarkable                      IMPRESSION:  1. Mild disc bulge C5/6 with subtle asymmetric right paracentral disc  protrusion with mild effacement of the ventral CSF space. No  narrowing of the spinal canal.      2. Central annulus tear C6/7 without disc herniation.      3. 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 patient referred by PCP Donny Das CNP with RLE pain    1) RLE pain  -Since 2020 associated with walking on incline treadmill limiting ability to work out and function, impairing QoL  -Refractive to >3 y conservative tx including Tylenol, NSAIDs, PT, >6 w home exercise tx, cyclobenzaprine  -Reviewed/discussed MR R tib/fib 1/4/24: gastroc strain  -As pt also exhibits LBP with TTP on exam, suspect neuraxial etiology- MRI L-spine ordered  -F/U 2 w to review results  -Consider referral to Dr Murray if gastroc strain appears to be isolated pain generator     2) Neck pain  -Chronic neck pain radiating to R shoulder without objective neuro deficit inhibiting ability to work and maintain QoL  -Refractive to yrs of conservative tx including Tylenol, NSAIDs, PT, >6 w home exercise tx, cyclobenzaprine  -Reviewed/discussed MRI c-spine 12/26/23: multilevel spondylosis featuring C5-6 R-sided disc protrusion effacing dura, C6-7 annulus tear  -Schedule C7-T1 OSCAR to target pain generator as seen on imaging and minimize risk/likelihood of chronic opioid use and/or surgery        Discussed procedure risks/benefits in detail with patient. Pt meets medical necessity for procedure due to failure of  conservative measures. Reviewed procedural risks including bleeding, infection, nerve damage, paralysis. Also reviewed mitigating factors such as screening for infection/blood thinner use, sterile precautions, and image-guidance when applicable. All questions answered. Pt/guardian expressed understanding and choose to proceed           Sonu Butler MD  Anesthesiologist & Interventional Pain Physician   [facility] [place]  O: [phone]  F: [phone]  8:47 AM  01/31/24

## 2024-01-31 NOTE — PROGRESS NOTES
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    Pt describes LB tenderness and pain down her RLE since 2020. Pt associates pain with walking on incline treadmill and pain is worse with any activity since. Pain is better with rest. Pt has tried Tylenol, NSAIDs, PT, >6 w home exercise tx, cyclobenzaprine.    Pt also describes neck pain >10 y without inciting trauma/incident. Pain is righ-sided and radiates to shoulder. Pain is worse with work and sitting at computer. Pt has tried Tylenol, NSAIDs, PT, >6 w home exercise tx, cyclobenzaprine.    Pt denies new-onset numbness, weakness, bowel/bladder incontinence.  Pt denies recent infection, allergy to Latex/iodine/contrast. Patient is currently taking the following blood thinner(s): N/A    REVIEW OF SYSTEMS  Review of Systems   Constitutional: Negative.    HENT: Negative.     Eyes: Negative.    Respiratory: Negative.     Cardiovascular: Negative.    Gastrointestinal: Negative.    Endocrine: Negative.    Musculoskeletal:  Positive for back pain, myalgias and neck pain.   Skin: Negative.    Neurological: Negative.    Hematological: Negative.    Psychiatric/Behavioral: Negative.         PAST MEDICAL HISTORY  Past Medical History:   Diagnosis Date    Cervical pain     DDD (degenerative disc disease), cervical      History reviewed. No pertinent surgical history.  Family History   Problem Relation Name Age of Onset    No Known Problems Mother      No Known Problems Father      No Known Problems Maternal Grandfather      Breast cancer Paternal Grandmother      Heart disease Paternal Grandmother         CURRENT MEDICATIONS  Current Outpatient Medications   Medication Sig Dispense Refill    cyclobenzaprine (Flexeril) 5 mg tablet Take 1 -2  "tablets at night (Patient not taking: Reported on 1/31/2024) 30 tablet 0    diclofenac (Cataflam) 50 mg tablet Take 1 tablet (50 mg) by mouth 3 times a day for 14 days. 42 tablet 0    hydrOXYzine HCL (Atarax) 25 mg tablet Take 1 tablet (25 mg) by mouth once daily at bedtime.      omeprazole (PriLOSEC) 10 mg DR capsule Take 1 capsule (10 mg) by mouth once daily in the morning. Take before meals.      sertraline (Zoloft) 50 mg tablet TAKE ONE TABLET BY MOUTH EVERY DAY 90 tablet 1    valACYclovir (Valtrex) 1 gram tablet Take 1 tablet (1,000 mg) by mouth 2 times a day. Take 2 tabs (2000 mg) 2 times a days for 1 day. 4 tablet 1     No current facility-administered medications for this visit.       ALLERGIES AND DRUG REACTIONS  Allergies   Allergen Reactions    Oxycodone Unknown          OBJECTIVE  Visit Vitals  /71   Pulse 61   Resp 16   Ht 1.676 m (5' 6\")   Wt 68 kg (150 lb)   LMP 12/22/2023 (Exact Date)   BMI 24.21 kg/m²   OB Status Having periods   Smoking Status Never   BSA 1.78 m²       Last Recorded Pain Score (if available):                Physical Exam  General: Sitting in chair, NAD  Head: NCAT  Eyes: Sclera/conjunctiva clear, EOMI, PERRL  Nose/mouth: MMM  CV: Good distal pulses  Lungs: Good/equal chest excursion  Abdomen: Soft, ND  Ext: No cyanosis/edema  MSK: c-spine alignment: WNL, R paraspinal m TTP, c-spine ROM: full. Neg Spurling, LHermitte    L-spine alignment: WNL, BL paraspinal m TTP, L-spine ROM: full  Neuro: AAOx3   Dermatome sensation to light touch  LEFT C5: WNL    RIGHT C5: WNL      LEFT C6: WNL       RIGHT C6: WNL      LEFT C7: WNL       RIGHT C7: WNL      LEFT C8: WNL       RIGHT C8: WNL      LEFT T1: WNL       RIGHT T1: WNL    LEFT L1 (lower pelvis/upper thigh): WNL    RIGHT L1: WNL      LEFT L2 (upper thigh): WNL       RIGHT: L2:WNL      LEFT L3 (medial knee): WNL       RIGHT L3: WNL      LEFT L4 (superior medial malleolus): WNL       RIGHT L4: WNL      LEFT L5 (dorsal foot): WNL       " RIGHT L5: WNL      LEFT S1 (lateral foot): WNL     RIGHT S1: WNL      LEFT S2 (popliteal fossa): WNL    RIGHT S2: WNL        Motor strength  LEFT C5 (elbow flexion): 5/5   RIGHT C5: 5/5  LEFT C6 (wrist extension): 5/5     RIGHT C6: 5/5  LEFT C7 (elbow extension): 5/5     RIGHT C7: 5/5  LEFT C8 (finger abduction): 5/5     RIGHT C8: 5/5  LEFT T1 (hand ): 5/5     RIGHT T1: 5/5    LEFT L2 (hip flexion): 5/5   RIGHT L2: 5/5  LEFT L3 (knee extension): 5/5     RIGHT L3: 5/5  LEFT L4 (dorsiflexion): 5/5     RIGHT L4: 5/5  LEFT L5 (EHL extension): 5/5     RIGHT L5: 5/5  LEFT S1 (plantarflexion): 5/5     RIGHT S1: 5/5  LEFT S2 (knee flexion): 5/5      RIGHT S2: 5/5    Special testing  Pettit: neg BL  DTR unremarkable  Seated slump test neg BL  Clonus: neg BL  Babinski: neg BL    Psych: affect nl  Skin: no rash/lesions      REVIEW OF LABORATORY DATA  I have reviewed the following lab results:  WBC   Date Value Ref Range Status   08/31/2023 7.4 4.5 - 11.0 K/UL Final     RBC   Date Value Ref Range Status   08/31/2023 3.38 (L) 4.0 - 4.9 M/UL Final     Hemoglobin   Date Value Ref Range Status   08/31/2023 10.6 (L) 12.0 - 15.0 GM/DL Final     Hematocrit   Date Value Ref Range Status   08/31/2023 31.4 (L) 36 - 44 % Final     MCV   Date Value Ref Range Status   08/31/2023 92.9 80 - 100 FL Final     MCH   Date Value Ref Range Status   08/31/2023 31.4 26 - 34 PG Final     MCHC   Date Value Ref Range Status   08/31/2023 33.8 31 - 37 % Final     Platelets   Date Value Ref Range Status   08/31/2023 256 150 - 450 K/UL Final     MPV   Date Value Ref Range Status   08/31/2023 9.8 7.0 - 12.6 CU Final     Sodium   Date Value Ref Range Status   08/31/2023 143 133 - 145 MMOL/L Final     Potassium   Date Value Ref Range Status   08/31/2023 3.7 3.4 - 5.1 MMOL/L Final     Bicarbonate   Date Value Ref Range Status   08/31/2023 22 (L) 24 - 31 MMOL/L Final     Urea Nitrogen   Date Value Ref Range Status   08/31/2023 5 (L) 8 - 25 MG/DL Final  "    Calcium   Date Value Ref Range Status   08/31/2023 8.5 8.5 - 10.4 MG/DL Final     No results found for: \"PROTIME\", \"PTT\", \"INR\", \"FIBRINOGEN\"      REVIEW OF RADIOLOGY   I have reviewed the following:  Radiology Studies           MRI R tib/fib 1/4/24:  1. Muscle strain of the medial and lateral gastrocnemius musculature  with mild feathery appearing interstitial muscle edema without  myotendinous tear or intramuscular hematoma formation. There is mild  edema deep to the muscle belly in the inter fascial plane with the  soleus muscle. No distal medial head gastrocnemius or soleus  myotendinous tear.      2. No stress reaction or stress fracture within the right  tibia/fibula.        MRI c-spine 12/26/23:  Alignment of the cervical spine demonstrates mild reversal normal  cervical lordosis. There is no listhesis. Vertebral body heights are  preserved. Disc space heights demonstrate mild loss disc space height  C5/6. Vertebral body signal demonstrates no focal marrow edema.  Spinal cord signal is unremarkable.      Evaluation of spinal levels are as follows:      C2/3 is unremarkable.      C3/4 is unremarkable.      C4/5 is unremarkable.      C5/6 demonstrates mild disc bulge with mild bilateral uncovertebral  joint hypertrophy. Subtle asymmetric component of right paracentral  disc protrusion measuring 1 mm. There is a effacement of the ventral  CSF space without flattening of the cord. No canal stenosis. Foramina  are unremarkable      C6/7 demonstrates central annulus tear. There is no disc herniation  demonstrated. No narrowing of the spinal canal or foramina. Minimal  disc bulge demonstrated.      C7/T1 is unremarkable                      IMPRESSION:  1. Mild disc bulge C5/6 with subtle asymmetric right paracentral disc  protrusion with mild effacement of the ventral CSF space. No  narrowing of the spinal canal.      2. Central annulus tear C6/7 without disc herniation.      3. 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 referred by PCP Donny Das CNP with RLE pain    1) RLE pain  -Since 2020 associated with walking on incline treadmill limiting ability to work out and function, impairing QoL  -Refractive to >3 y conservative tx including Tylenol, NSAIDs, PT, >6 w home exercise tx, cyclobenzaprine  -Reviewed/discussed MR R tib/fib 1/4/24: gastroc strain  -As pt also exhibits LBP with TTP on exam, suspect neuraxial etiology- MRI L-spine ordered  -F/U 2 w to review results  -Consider referral to Dr Murray if gastroc strain appears to be isolated pain generator     2) Neck pain  -Chronic neck pain radiating to R shoulder without objective neuro deficit inhibiting ability to work and maintain QoL  -Refractive to yrs of conservative tx including Tylenol, NSAIDs, PT, >6 w home exercise tx, cyclobenzaprine  -Reviewed/discussed MRI c-spine 12/26/23: multilevel spondylosis featuring C5-6 R-sided disc protrusion effacing dura, C6-7 annulus tear  -Schedule C7-T1 OSCAR to target pain generator as seen on imaging and minimize risk/likelihood of chronic opioid use and/or surgery        Discussed procedure risks/benefits in detail with patient. Pt meets medical necessity for procedure due to failure of conservative measures. Reviewed procedural risks including bleeding, infection, nerve damage, paralysis. Also reviewed mitigating factors such as screening for infection/blood thinner use, sterile precautions, and image-guidance when applicable. All questions answered. Pt/guardian expressed understanding and choose to proceed           Sonu Butler MD  Anesthesiologist & Interventional Pain Physician   [facility] [place]  O: [phone]  F: [phone]  8:47 AM  01/31/24

## 2024-02-02 ENCOUNTER — APPOINTMENT (OUTPATIENT)
Dept: PHYSICAL THERAPY | Facility: CLINIC | Age: 39
End: 2024-02-02

## 2024-02-02 ENCOUNTER — APPOINTMENT (OUTPATIENT)
Dept: PHYSICAL THERAPY | Facility: CLINIC | Age: 39
End: 2024-02-02
Payer: COMMERCIAL

## 2024-02-02 ENCOUNTER — TELEPHONE (OUTPATIENT)
Dept: PAIN MEDICINE | Facility: CLINIC | Age: 39
End: 2024-02-02
Payer: COMMERCIAL

## 2024-02-05 ASSESSMENT — ENCOUNTER SYMPTOMS
MYALGIAS: 1
CONSTITUTIONAL NEGATIVE: 1
CARDIOVASCULAR NEGATIVE: 1
RESPIRATORY NEGATIVE: 1
ARTHRALGIAS: 1

## 2024-02-05 NOTE — PROGRESS NOTES
Established patient  History Of Present Illness  Nehal Rodriguez is a 38 y.o. female presenting with for her follow up of her RIGHT shoulder and CERVICAL spine.     Past Medical History  She has a past medical history of Cervical pain and DDD (degenerative disc disease), cervical.    Surgical History  She has no past surgical history on file.     Social History  She reports that she has never smoked. She has never used smokeless tobacco. She reports current alcohol use of about 2.0 standard drinks of alcohol per week. She reports that she does not use drugs.    Family History  Family History   Problem Relation Name Age of Onset    No Known Problems Mother      No Known Problems Father      No Known Problems Maternal Grandfather      Breast cancer Paternal Grandmother      Heart disease Paternal Grandmother          Allergies  Codeine    Review of Systems  Review of Systems   Constitutional: Negative.    Respiratory: Negative.     Cardiovascular: Negative.    Musculoskeletal:  Positive for arthralgias and myalgias.   All other systems reviewed and are negative.     Last Recorded Vitals  There were no vitals taken for this visit.     Examination:  RIGHT CERVICAL   Erythema: Negative.   Edema: Negative.   Effusion: Negative.   TART Findings: Positive: Tissue Texture Changes, Restriction, Tenderness.   Warmth: Negative.   Ecchymosis/Bruising: Negative.   Percussion Test (CERVICAL): Positive: C3, C4, C5   Tuning Fork Test (CERVICAL): Negative.   Percussion Test (THORACIC):  Negative.   Tuning Fork Test (THORACIC):  Negative.   Abrasions: Negative.      Orientation (CERVICAL): Positive: decreased lordosis   Orientation (THORACIC):  Normal.      ROM (CERVICAL):Positive:  Decreased Forward Flexion (patient reports tightness), Extension (patient reports tightness), and Lateral Bending (Side Bending) (patient reports tightness).      ROM (THORACIC):   FROM, Flexion, Extension, Rotation, and Side Bending.      Muscle  Strength:   +5/+5 Cervical Flexion, Extension, Left Rotation, Right Rotation, Left Lateral Flexion, Right Lateral Flexion, Trapezius (Shrug), Anterior Deltoid, Posterior Deltoid and Lateral Deltoid.          DTR/Neurological: Symmetrical  +2/+4 Biceps Reflex (C5)  +2/+4 Brachioradialis Reflex (C6)  +2/+4 Triceps Reflex (C7).      Sensation/Neurological Cervical:   Negative, Symmetrical:  C2: Lower jaw and back of head  C3: Upper neck and back of head  C4: Lower neck, upper shoulders, and upper chest  C5: Area of collarbones, lateral upper arms, and upper chest  C6: Lateral forearms, thumbs, anterior index finger, and lateral half of the middle finger  C7: Some of posterior index finger, medial half of middle finger, upper posterior back, and back of arms  C8: Ring finger, little finger, medial forearm, and posterior upper back.      Sensation/Neurological Thoracic:   Negative, Symmetrical; 2-Point Discrimination Test: Negative  T1: Medial anterior upper arm, axilla, upper chest and posterior back  T2: Upper chest and posterior back  T3: Upper chest and posterior back  T4: Upper chest (nipple area) and posterior back  T5: Mid chest and posterior back  T6: Mid chest and posterior back  T7: Mid chest and posterior back  T8: Upper abdomen and middle posterior back  T9: Upper abdomen and middle posterior back  T10: Abdomen (area of belly button) and middle posterior back  T11: Abdomen and middle posterior back  T12: Lower abdomen and middle posterior back.      Palpation:   Positive: Tender to palpation over upper trapezius, cervical paraspinal musculature, rhomboid      Vascular:   +2/+4,Carotid  +2/+4 Radial  Capillary Refill< 2 seconds.      Cervicle Spine Tests:  Lhermitte's Sign: Positive:  Spurling's Test (Atlanto-Axial Compression Test): Negative.   Reverse Spurling's Test: Negative.   Adson's Test: Negative.   Tony's Test: Negative.   Durand's Test: Negative.   Costoclavicular Test: Negative.   Cervical Spine  Distraction Test: Negative.   Tinel's Sign: Negative.   Brachial Plexus Tension Test: Negative.   Brachial Plexus Stretch Test: Negative.   Neutral Axial Compression Test: Negative.   Shoulder ABduction (Bakody) Test: Negative.   Cervical Compression with Max Foraminal Compression Test: Positive:  Intervertebral Foramina Compression Test: Negative.   Flexion Compression Test: Negative.   Extension Compression Test: Negative.   Maximum Compression of the Intervertebral Foramina Test: Negative.   Forward Flexion Test: Negative.   Swallowing Test: Negative.   Percussion Test: Negative.   Clark-العلي Test: Negative.   O'Larissa Test: Negative.   Shoulder Press Test: Negative.   Caleb Compression Test: Negative.   Rotation Compression Test: Negative.   Side Bending Compression Test: Negative.   Thoracic Outlet Test: Negative.   Vertebral Artery Test: Negative.   Valsalva Maneuver Test: Negative.          Thoracic Spine Tests:  Pillai Forward Bend Test: Negative.   Brudzinski's/Kernig's Test: Negative.   Slump Test: Negative.   First Thoracic Neve Root Test: Negative.   Passive Scapular Approximation: Negative.   Scapular Elevation Test: Negative.   Scapular Retraction Test: Negative.   Scapular Protraction (Winging): Negative.   Costoclavicular Test: Negative.   Elevated Arms Stress Test (Roberto Test): Negative.   Kyphosis Test on Hands and Knees: Negative.   Segmental Function in the Thoracic Spine in Extension Test: Negative.       Imaging and Diagnostics Review:  No new radiographs were obtained today.      Assessment   No diagnosis found.    Plan   Treatment or Intervention:  May continue to alternate ice and moist heat as needed    Continue physical therapy 1-2 times a week for 10-12 weeks with manual therapy, dry needling, IASTM, and traction   Again stressed the importance of wearing shoes with good stability control to help with the biomechanics affecting the spine    Again stressed the importance of wearing full  foot insoles to help with the biomechanics affecting the spine and to provide cushioning    Recommendation over-the-counter calcium with vitamin-D 2 -3000+ milligrams a day, as well as OTC symphytum as directed daily to promote bony healing, in addition to a daily multivitamin.  Recommendation over-the-counter Move Free for joint health.    May take OTC Tylenol Extra Strength or OTC Tylenol Arthritis, taking one every 6-8 hours with food as needed,  Patient advised regarding the risks and/or potential adverse reactions and/or side effects of any prescribed medications along with any over-the-counter medications or any supplements used. Patient advised to seek immediate medical care if any adverse reactions occur. The patient and/or patient(s) parent(s) verbalized their understanding    Diagnostic studies  MR cervical spine wo IV contrast  Narrative: Interpreted By:  Dianne Brown,   STUDY:  MR CERVICAL SPINE WO IV CONTRAST; ;  12/26/2023 8:47 am      INDICATION:  Signs/Symptoms:cervical radiculopathy.      COMPARISON:  X-ray 12/07/2023      ACCESSION NUMBER(S):  [id number]      ORDERING CLINICIAN:  JOSE LUIS HARRISON      TECHNIQUE:  Multiplanar multisequence MRI obtained of the cervical spine.      FINDINGS:  Alignment of the cervical spine demonstrates mild reversal normal  cervical lordosis. There is no listhesis. Vertebral body heights are  preserved. Disc space heights demonstrate mild loss disc space height  C5/6. Vertebral body signal demonstrates no focal marrow edema.  Spinal cord signal is unremarkable.      Evaluation of spinal levels are as follows:      C2/3 is unremarkable.      C3/4 is unremarkable.      C4/5 is unremarkable.      C5/6 demonstrates mild disc bulge with mild bilateral uncovertebral  joint hypertrophy. Subtle asymmetric component of right paracentral  disc protrusion measuring 1 mm. There is a effacement of the ventral  CSF space without flattening of the cord. No canal stenosis.  Foramina  are unremarkable      C6/7 demonstrates central annulus tear. There is no disc herniation  demonstrated. No narrowing of the spinal canal or foramina. Minimal  disc bulge demonstrated.      C7/T1 is unremarkable       Impression:   1. Mild disc bulge C5/6 with subtle asymmetric right paracentral disc  protrusion with mild effacement of the ventral CSF space. No  narrowing of the spinal canal.      2. Central annulus tear C6/7 without disc herniation.      3. No foraminal narrowing within the cervical spine.          MACRO:  None      Signed by: Dianne Brown 12/26/2023 9:28 AM  Dictation workstation:   [id number]      Activity Instructions, Restrictions, and Accommodations:      Consultations/Referrals:  Physical therapy    Follow-up:      SABINA CORRAL on 2/5/24 at 4:06 PM.     Peña Das CNP

## 2024-02-07 ENCOUNTER — APPOINTMENT (OUTPATIENT)
Dept: PHYSICAL THERAPY | Facility: CLINIC | Age: 39
End: 2024-02-07

## 2024-02-07 ENCOUNTER — APPOINTMENT (OUTPATIENT)
Dept: PHYSICAL THERAPY | Facility: CLINIC | Age: 39
End: 2024-02-07
Payer: COMMERCIAL

## 2024-02-09 ENCOUNTER — APPOINTMENT (OUTPATIENT)
Dept: PHYSICAL THERAPY | Facility: CLINIC | Age: 39
End: 2024-02-09
Payer: COMMERCIAL

## 2024-02-09 ENCOUNTER — APPOINTMENT (OUTPATIENT)
Dept: PHYSICAL THERAPY | Facility: CLINIC | Age: 39
End: 2024-02-09

## 2024-02-12 ENCOUNTER — APPOINTMENT (OUTPATIENT)
Dept: SPORTS MEDICINE | Facility: CLINIC | Age: 39
End: 2024-02-12
Payer: COMMERCIAL

## 2024-02-16 ENCOUNTER — APPOINTMENT (OUTPATIENT)
Dept: PHYSICAL THERAPY | Facility: CLINIC | Age: 39
End: 2024-02-16
Payer: COMMERCIAL

## 2024-02-16 ENCOUNTER — APPOINTMENT (OUTPATIENT)
Dept: PHYSICAL THERAPY | Facility: CLINIC | Age: 39
End: 2024-02-16

## 2024-02-23 ENCOUNTER — APPOINTMENT (OUTPATIENT)
Dept: PHYSICAL THERAPY | Facility: CLINIC | Age: 39
End: 2024-02-23

## 2024-02-23 ENCOUNTER — APPOINTMENT (OUTPATIENT)
Dept: PHYSICAL THERAPY | Facility: CLINIC | Age: 39
End: 2024-02-23
Payer: COMMERCIAL

## 2024-02-23 ENCOUNTER — HOSPITAL ENCOUNTER (OUTPATIENT)
Dept: RADIOLOGY | Facility: HOSPITAL | Age: 39
Discharge: HOME | End: 2024-02-23
Payer: COMMERCIAL

## 2024-02-23 DIAGNOSIS — M54.16 LUMBAR RADICULITIS: ICD-10-CM

## 2024-02-23 PROCEDURE — 72148 MRI LUMBAR SPINE W/O DYE: CPT

## 2024-03-07 ENCOUNTER — HOSPITAL ENCOUNTER (OUTPATIENT)
Dept: GASTROENTEROLOGY | Facility: HOSPITAL | Age: 39
Discharge: HOME | End: 2024-03-07
Payer: COMMERCIAL

## 2024-03-07 ENCOUNTER — HOSPITAL ENCOUNTER (OUTPATIENT)
Dept: RADIOLOGY | Facility: HOSPITAL | Age: 39
Discharge: HOME | End: 2024-03-07
Payer: COMMERCIAL

## 2024-03-07 VITALS
OXYGEN SATURATION: 98 % | HEIGHT: 66 IN | RESPIRATION RATE: 16 BRPM | SYSTOLIC BLOOD PRESSURE: 108 MMHG | WEIGHT: 148 LBS | TEMPERATURE: 98.4 F | DIASTOLIC BLOOD PRESSURE: 77 MMHG | HEART RATE: 67 BPM | BODY MASS INDEX: 23.78 KG/M2

## 2024-03-07 DIAGNOSIS — M54.12 CERVICAL RADICULOPATHY: ICD-10-CM

## 2024-03-07 PROCEDURE — 62321 NJX INTERLAMINAR CRV/THRC: CPT | Performed by: ANESTHESIOLOGY

## 2024-03-07 PROCEDURE — 2550000001 HC RX 255 CONTRASTS: Performed by: ANESTHESIOLOGY

## 2024-03-07 PROCEDURE — 2500000005 HC RX 250 GENERAL PHARMACY W/O HCPCS: Performed by: ANESTHESIOLOGY

## 2024-03-07 PROCEDURE — 2500000004 HC RX 250 GENERAL PHARMACY W/ HCPCS (ALT 636 FOR OP/ED): Performed by: ANESTHESIOLOGY

## 2024-03-07 RX ORDER — TRIAMCINOLONE ACETONIDE 40 MG/ML
INJECTION, SUSPENSION INTRA-ARTICULAR; INTRAMUSCULAR AS NEEDED
Status: COMPLETED | OUTPATIENT
Start: 2024-03-07 | End: 2024-03-07

## 2024-03-07 RX ORDER — LIDOCAINE HYDROCHLORIDE 10 MG/ML
INJECTION INFILTRATION; PERINEURAL AS NEEDED
Status: COMPLETED | OUTPATIENT
Start: 2024-03-07 | End: 2024-03-07

## 2024-03-07 RX ADMIN — IOHEXOL 3 ML: 240 INJECTION, SOLUTION INTRATHECAL; INTRAVASCULAR; INTRAVENOUS; ORAL at 09:50

## 2024-03-07 RX ADMIN — TRIAMCINOLONE ACETONIDE 80 MG: 40 INJECTION, SUSPENSION INTRA-ARTICULAR; INTRAMUSCULAR at 09:50

## 2024-03-07 RX ADMIN — LIDOCAINE HYDROCHLORIDE 3 ML: 10 INJECTION, SOLUTION INFILTRATION; PERINEURAL at 09:50

## 2024-03-07 ASSESSMENT — ENCOUNTER SYMPTOMS
ENDOCRINE NEGATIVE: 1
NEUROLOGICAL NEGATIVE: 1
CONSTITUTIONAL NEGATIVE: 1
RESPIRATORY NEGATIVE: 1
NECK PAIN: 1
PSYCHIATRIC NEGATIVE: 1
BACK PAIN: 1
HEMATOLOGIC/LYMPHATIC NEGATIVE: 1
MYALGIAS: 1
EYES NEGATIVE: 1
GASTROINTESTINAL NEGATIVE: 1
CARDIOVASCULAR NEGATIVE: 1

## 2024-03-07 ASSESSMENT — COLUMBIA-SUICIDE SEVERITY RATING SCALE - C-SSRS
2. HAVE YOU ACTUALLY HAD ANY THOUGHTS OF KILLING YOURSELF?: NO
6. HAVE YOU EVER DONE ANYTHING, STARTED TO DO ANYTHING, OR PREPARED TO DO ANYTHING TO END YOUR LIFE?: NO
1. IN THE PAST MONTH, HAVE YOU WISHED YOU WERE DEAD OR WISHED YOU COULD GO TO SLEEP AND NOT WAKE UP?: NO

## 2024-03-07 ASSESSMENT — PAIN - FUNCTIONAL ASSESSMENT
PAIN_FUNCTIONAL_ASSESSMENT: 0-10
PAIN_FUNCTIONAL_ASSESSMENT: 0-10

## 2024-03-07 ASSESSMENT — PAIN SCALES - GENERAL
PAINLEVEL_OUTOF10: 0 - NO PAIN
PAINLEVEL_OUTOF10: 6
PAINLEVEL_OUTOF10: 6

## 2024-03-07 ASSESSMENT — PAIN DESCRIPTION - DESCRIPTORS: DESCRIPTORS: ACHING;SHARP

## 2024-03-07 NOTE — DISCHARGE INSTRUCTIONS
DISCHARGE INSTRUCTIONS FOR INJECTIONS     You underwent a cervical epidural steroid injection today    Aftermost injections, it is recommended that you relax and limit your activity for the remainder of the day unless you have been told otherwise by your pain physician.  You should not drive a car, operate machinery, or make important legal decisions unless otherwise directed by your pain physician.  You may resume your normal activity, including exercise, tomorrow.      Keep a written pain diary of how much pain relief you experienced following the injection procedure and the length of time of pain relief you experienced pain relief. Following diagnostic injections like medial branch nerve blocks, sacroiliac joint blocks, stellate ganglion injections and other blocks, it is very important you record the specific amount of pain relief you experienced immediately after the injectionand how long it lasted. Your doctor will ask you for this information at your follow up visit.     For all injections, please keep the injection site dry and inspect the site for a couple of days. You may remove the Band-Aid the day of the injection at any time.     Some discomfort, bruising or slight swelling may occur at the injection site. This is not abnormal if it occurs.  If needed you may:    -Take over the counter medication such as Tylenol or Motrin.   -Apply an ice pack for 30 minutes, 2 to 3 times a day for the first 24 hours.     You may shower today; no soaking baths, hot tubs, whirlpools or swimming pools for two days.      If you are given steroids in your injection, it may take 3-5 days for the steroid medication to take effect. You may notice a worsening of your symptoms for 1-2 days after the injection. This is not abnormal.  You may use acetaminophen, ibuprofen, or prescription medication that your doctor may have prescribed for you if you need to do so.     A few common side effects of steroids include facial flushing,  sweating, restlessness, irritability,difficulty sleeping, increase in blood sugar, and increased blood pressure. If you have diabetes, please monitor your blood sugar at least once a day for at least 5 days. If you have poorly controlled high blood pressure, monitoryour blood pressure for at least 2 days and contact your primary care physician if these numbers are unusually high for you.      If you take aspirin or non-steroidal anti-inflammatory drugs (examples are Motrin, Advil, ibuprofen, Naprosyn, Voltaren, Relafen, etc.) you may restart these this evening, but stop taking it 3 days before your next appointment, unless instructed otherwiseby your physician.      You do not need to discontinue non-aspirin-containing pain medications prior to an injection (examples: Celebrex, tramadol, hydrocodone and acetaminophen).      If you take a blood thinning medication (Coumadin, Lovenox, Fragmin,Ticlid, Plavix, Pradaxa, etc.), please discuss this with your primary care physician/cardiologist and your pain physician. These medications MUST be discontinued before you can have an injection safely, without the risk of uncontrolled bleeding. If these medications are not discontinued for an appropriate period of time, you will not be able to receivean injection.      If you are taking Coumadin, please have your INR checked the morning of your procedure and bringthe result to your appointment unless otherwise instructed. If your INR is over 1.2, your injection may need to be rescheduled to avoid uncontrolled bleeding from the needle placement.     Call [facility] at [phone] between 8am-4pm Monday - Friday if you are experiencing the following:    If you received an epidural or spinal injection:    -Headache that doesnot go away with medicine, is worse when sitting or standing up, and is greatly relieved upon lying down.   -Severe pain worse than or different than your baseline pain.   -Chills or fever  (101º F or greater).   -Drainage or signs of infection at the injection site     Go directly to the Emergency Department if you are experiencing the following and received an epidural or spinal injection:   -Abrupt weakness or progressive weakness in your legs that starts after you leave the clinic.   -Abrupt severe or worsening numbness in your legs.   -Inability to urinate after the injection or loss of bowel or bladder control without the urge to defecate or urinate.     If you have a clinical question that cannot wait until your next appointment, please call [phone] between 8am-4pm Monday - Friday or send a [facility] [facility] message. We do our best to return all non-emergency messages within 24 hours, Monday - Friday. A nurse or physician will return your message.      If you need to cancel an appointment, please call the scheduling staff at [phone] during normal business hours or leave a message at least 24 hours in advance.     If you are going to be sedated for your next procedure, you MUST have responsible adult who can legally drive accompany you home. You cannot eat or drink for eight hours prior to the planned procedure if you are going to receive sedation. You may take your non-blood thinning medications with a small sip of water.

## 2024-03-07 NOTE — H&P
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    Pain and overall medical condition unchanged from previous visit. Pt denies new-onset numbness, weakness, bowel/bladder incontinence.  Pt denies recent infection/abx use, allergy to Latex/iodine/contrast. Patient is currently taking the following blood thinner(s): N/A    REVIEW OF SYSTEMS  Review of Systems   Constitutional: Negative.    HENT: Negative.     Eyes: Negative.    Respiratory: Negative.     Cardiovascular: Negative.    Gastrointestinal: Negative.    Endocrine: Negative.    Musculoskeletal:  Positive for back pain, myalgias and neck pain.   Skin: Negative.    Neurological: Negative.    Hematological: Negative.    Psychiatric/Behavioral: Negative.         PAST MEDICAL HISTORY  Past Medical History:   Diagnosis Date    Cervical pain     DDD (degenerative disc disease), cervical      History reviewed. No pertinent surgical history.  Family History   Problem Relation Name Age of Onset    No Known Problems Mother      No Known Problems Father      No Known Problems Maternal Grandfather      Breast cancer Paternal Grandmother      Heart disease Paternal Grandmother         CURRENT MEDICATIONS  Current Outpatient Medications   Medication Sig Dispense Refill    diclofenac (Cataflam) 50 mg tablet Take 1 tablet (50 mg) by mouth 3 times a day for 14 days. 42 tablet 0    omeprazole (PriLOSEC) 10 mg DR capsule Take 1 capsule (10 mg) by mouth once daily in the morning. Take before meals.      sertraline (Zoloft) 50 mg tablet TAKE ONE TABLET BY MOUTH EVERY DAY 90 tablet 1    valACYclovir (Valtrex) 1 gram tablet Take 1 tablet (1,000 mg) by mouth 2 times a day. Take 2 tabs (2000 mg) 2 times a days for 1 day. (Patient not taking: Reported on 3/7/2024) 4 tablet 1     No current  "facility-administered medications for this encounter.       ALLERGIES AND DRUG REACTIONS  Allergies   Allergen Reactions    Codeine Itching          OBJECTIVE  Visit Vitals  OB Status Having periods   Smoking Status Never     General: Lying comfortably in bed, NAD  Head: NCAT  Eyes: Sclera/conjunctiva clear, EOMI, PERRL  Nose/mouth: MMM  CV: Good distal pulses  Lungs: Good/equal chest excursion  Abdomen: Soft, ND  Ext: No cyanosis/edema  MSK: Able to move extremities  Neuro: AAOx3, grossly normal  Psych: affect nl      REVIEW OF LABORATORY DATA  I have reviewed the following lab results:  No results found for: \"WBC\", \"RBC\", \"HGB\", \"HCT\", \"MCV\", \"MCH\", \"MCHC\", \"RDW\", \"PLT\", \"MPV\"  No results found for: \"NA\", \"K\", \"CO2\", \"BUN\", \"CALCIUM\"  No results found for: \"PROTIME\", \"PTT\", \"INR\", \"FIBRINOGEN\"      REVIEW OF RADIOLOGY DATA  I have reviewed the following:  Radiology Studies           MRI c-spine 12/26/23:  Alignment of the cervical spine demonstrates mild reversal normal  cervical lordosis. There is no listhesis. Vertebral body heights are  preserved. Disc space heights demonstrate mild loss disc space height  C5/6. Vertebral body signal demonstrates no focal marrow edema.  Spinal cord signal is unremarkable.      Evaluation of spinal levels are as follows:      C2/3 is unremarkable.      C3/4 is unremarkable.      C4/5 is unremarkable.      C5/6 demonstrates mild disc bulge with mild bilateral uncovertebral  joint hypertrophy. Subtle asymmetric component of right paracentral  disc protrusion measuring 1 mm. There is a effacement of the ventral  CSF space without flattening of the cord. No canal stenosis. Foramina  are unremarkable      C6/7 demonstrates central annulus tear. There is no disc herniation  demonstrated. No narrowing of the spinal canal or foramina. Minimal  disc bulge demonstrated.      C7/T1 is unremarkable                      IMPRESSION:  1. Mild disc bulge C5/6 with subtle asymmetric right " paracentral disc  protrusion with mild effacement of the ventral CSF space. No  narrowing of the spinal canal.      2. Central annulus tear C6/7 without disc herniation.      3. 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    1) Cervical radiculitis   -Chronic neck pain radiating to R shoulder without objective neuro deficit inhibiting ability to work and maintain QoL  -Refractive to yrs of conservative tx including Tylenol, NSAIDs, PT, >6 w home exercise tx, cyclobenzaprine  -MRI c-spine 12/26/23: multilevel spondylosis featuring C5-6 R-sided disc protrusion effacing dura, C6-7 annulus tear  -C7-T1 OSCAR today to target pain generator as seen on imaging and minimize risk/likelihood of chronic opioid use and/or surgery  -F/U 2 w      Discussed procedure risks/benefits in detail with patient. Pt meets medical necessity for procedure due to failure of conservative measures. Reviewed procedural risks including bleeding, infection, nerve damage, paralysis. Also reviewed mitigating factors such as screening for infection/blood thinner use, sterile precautions, and image-guidance when applicable. All questions answered. Pt/guardian expressed understanding and choose to proceed            Sonu Butler MD  Anesthesiologist & Interventional Pain Physician   [facility]  O: [phone]  F: [phone]  9:38 AM  03/07/24

## 2024-03-07 NOTE — OP NOTE
Cervical Epidural Procedure Note    Procedure: The risks and benefits of treatment options and alternatives were discussed with the patient, and consent was obtained for a cervical epidural steroid injection. She wishes to proceed. She was placed in a prone position. With fluoroscopic assistance, the C7-T1 interspace was identified. After prep with chlorhexidine and 1% lidocaine local infiltration, an 18- gauge Touhy needle was inserted coaxially. Using cautious loss of resistance to saline technique, firm loss of resistance was appreciated at 4 cm. Aspiration revealed no cerebrospinal fluid, blood, or air.     3 cc of Omnipaque contrast was injected which revealed spread within the epidural space and a normal bilateral epidurogram on fluoroscopy. This was confirmed with AP and contralateral oblique views. After negative aspiration, 80 mg triamcinolone + 2 ml NS was easily injected for total of 4 ml injectate.    Pt monitored with pulse ox, NIBP. See nursing flowsheet for VS record (and sedation record if applicable)    There were no complications, and the patient tolerated the procedure well. There was no numbness or weakness at the time of discharge. She was instructed to await the effects of the steroid over the next several days, to continue with physical therapy as tolerated, and follow up with me 2 weeks.      Sonu Butler MD  Interventional Pain Physician  [facility]  9:41 AM  03/07/24

## 2024-03-07 NOTE — POST-PROCEDURE NOTE
Patient pulled from pain procedure A&O x 3. Denies pain at this time. VSS. Denies numbness, tingling, and weakness.  discharge instructions provided to and reviewed with patient. She has verbalized understanding. All questions answered.

## 2024-03-11 ENCOUNTER — OFFICE VISIT (OUTPATIENT)
Dept: PRIMARY CARE | Facility: CLINIC | Age: 39
End: 2024-03-11
Payer: COMMERCIAL

## 2024-03-11 DIAGNOSIS — Z11.1 ENCOUNTER FOR SCREENING FOR RESPIRATORY TUBERCULOSIS: Primary | ICD-10-CM

## 2024-03-11 PROCEDURE — 1036F TOBACCO NON-USER: CPT

## 2024-03-11 PROCEDURE — 86580 TB INTRADERMAL TEST: CPT

## 2024-03-14 ENCOUNTER — OFFICE VISIT (OUTPATIENT)
Dept: PRIMARY CARE | Facility: CLINIC | Age: 39
End: 2024-03-14
Payer: COMMERCIAL

## 2024-03-14 DIAGNOSIS — Z11.1 ENCOUNTER FOR SCREENING FOR RESPIRATORY TUBERCULOSIS: Primary | ICD-10-CM

## 2024-03-14 LAB
INDURATION: 0 MM
TB SKIN TEST: NEGATIVE

## 2024-03-14 PROCEDURE — 1036F TOBACCO NON-USER: CPT

## 2024-03-27 ENCOUNTER — DOCUMENTATION (OUTPATIENT)
Dept: PHYSICAL THERAPY | Facility: CLINIC | Age: 39
End: 2024-03-27
Payer: COMMERCIAL

## 2024-03-27 NOTE — PROGRESS NOTES
Physical Therapy    Discharge Summary    Name: Nehal Rodriguez  MRN: [id number]  : 1985  Date: 24    Discharge Summary: PT    Discharge Information: Date of discharge 3/27/24, Date of last visit 23, Date of evaluation 12/15/23, and Number of attended visits 2    Therapy Summary: Pt was transferred to [facility] to continue PT POC. Pt cancelled all her appointments and did not continue therapy.     Discharge Status: Resolved     Rehab Discharge Reason: Failed to schedule and/or keep follow-up appointment(s)

## 2024-03-29 ENCOUNTER — APPOINTMENT (OUTPATIENT)
Dept: PAIN MEDICINE | Facility: CLINIC | Age: 39
End: 2024-03-29
Payer: COMMERCIAL

## 2024-04-12 ENCOUNTER — APPOINTMENT (OUTPATIENT)
Dept: PAIN MEDICINE | Facility: CLINIC | Age: 39
End: 2024-04-12
Payer: COMMERCIAL

## 2024-05-17 ENCOUNTER — OFFICE VISIT (OUTPATIENT)
Dept: PAIN MEDICINE | Facility: CLINIC | Age: 39
End: 2024-05-17
Payer: COMMERCIAL

## 2024-05-17 VITALS
HEART RATE: 64 BPM | SYSTOLIC BLOOD PRESSURE: 99 MMHG | OXYGEN SATURATION: 98 % | WEIGHT: 148 LBS | DIASTOLIC BLOOD PRESSURE: 66 MMHG | HEIGHT: 66 IN | BODY MASS INDEX: 23.78 KG/M2

## 2024-05-17 DIAGNOSIS — M54.12 RADICULOPATHY OF CERVICAL REGION: Primary | ICD-10-CM

## 2024-05-17 DIAGNOSIS — M54.16 LUMBAR RADICULITIS: ICD-10-CM

## 2024-05-17 PROCEDURE — 1036F TOBACCO NON-USER: CPT | Performed by: ANESTHESIOLOGY

## 2024-05-17 PROCEDURE — 99214 OFFICE O/P EST MOD 30 MIN: CPT | Performed by: ANESTHESIOLOGY

## 2024-05-17 RX ORDER — DULOXETIN HYDROCHLORIDE 30 MG/1
CAPSULE, DELAYED RELEASE ORAL
Qty: 60 CAPSULE | Refills: 0 | Status: SHIPPED | OUTPATIENT
Start: 2024-05-17

## 2024-05-17 ASSESSMENT — ENCOUNTER SYMPTOMS
ENDOCRINE NEGATIVE: 1
NECK PAIN: 1
CONSTITUTIONAL NEGATIVE: 1
CARDIOVASCULAR NEGATIVE: 1
RESPIRATORY NEGATIVE: 1
NEUROLOGICAL NEGATIVE: 1
PSYCHIATRIC NEGATIVE: 1
GASTROINTESTINAL NEGATIVE: 1
HEMATOLOGIC/LYMPHATIC NEGATIVE: 1
BACK PAIN: 1
MYALGIAS: 1
EYES NEGATIVE: 1

## 2024-05-17 ASSESSMENT — PAIN SCALES - GENERAL
PAINLEVEL_OUTOF10: 1
PAINLEVEL: 10-WORST PAIN EVER

## 2024-05-17 ASSESSMENT — PAIN DESCRIPTION - DESCRIPTORS: DESCRIPTORS: BURNING;ACHING

## 2024-05-17 ASSESSMENT — PAIN - FUNCTIONAL ASSESSMENT: PAIN_FUNCTIONAL_ASSESSMENT: 0-10

## 2024-05-17 NOTE — PROGRESS NOTES
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     On 3/7, pt underwent RYAN with 50% relief for 1 mo. Since that time, neck pain radiating to R shoulder has returned. In the meantime, pt endorses ongoing R-sided LBP with occasional R calf pain. Completed MRI L-spine and would like to discuss results    Pt denies new-onset numbness, weakness, bowel/bladder incontinence.  Pt denies recent infection, allergy to Latex/iodine/contrast. Patient is currently taking the following blood thinner(s): N/A    REVIEW OF SYSTEMS  Review of Systems   Constitutional: Negative.    HENT: Negative.     Eyes: Negative.    Respiratory: Negative.     Cardiovascular: Negative.    Gastrointestinal: Negative.    Endocrine: Negative.    Musculoskeletal:  Positive for back pain, myalgias and neck pain.   Skin: Negative.    Neurological: Negative.    Hematological: Negative.    Psychiatric/Behavioral: Negative.         PAST MEDICAL HISTORY  Past Medical History:   Diagnosis Date    Cervical pain     DDD (degenerative disc disease), cervical      History reviewed. No pertinent surgical history.  Family History   Problem Relation Name Age of Onset    No Known Problems Mother      No Known Problems Father      No Known Problems Maternal Grandfather      Breast cancer Paternal Grandmother      Heart disease Paternal Grandmother         CURRENT MEDICATIONS  Current Outpatient Medications   Medication Sig Dispense Refill    omeprazole (PriLOSEC) 10 mg DR capsule Take 1 capsule (10 mg) by mouth once daily in the morning. Take before meals.      sertraline (Zoloft) 50 mg tablet TAKE ONE TABLET BY MOUTH EVERY DAY 90 tablet 1    diclofenac (Cataflam) 50 mg tablet Take 1 tablet (50 mg) by mouth 3 times a day for 14 days. 42 tablet 0  "    No current facility-administered medications for this visit.       ALLERGIES AND DRUG REACTIONS  Allergies   Allergen Reactions    Codeine Itching          OBJECTIVE  Visit Vitals  BP 99/66   Pulse 64   Ht 1.676 m (5' 6\")   Wt 67.1 kg (148 lb)   SpO2 98%   BMI 23.89 kg/m²   OB Status Having periods   Smoking Status Never   BSA 1.77 m²       Last Recorded Pain Score (if available):                Physical Exam  General: Sitting in chair, NAD  Head: NCAT  Eyes: Sclera/conjunctiva clear, EOMI, PERRL  Nose/mouth: MMM  CV: Good distal pulses  Lungs: Good/equal chest excursion  Abdomen: Soft, ND  Ext: No cyanosis/edema  MSK: c-spine alignment: WNL, R paraspinal m TTP, c-spine ROM: full. Neg Spurling, LHermitte    L-spine alignment: WNL, BL paraspinal m TTP, L-spine ROM: full    Neuro: AAOx3   Dermatome sensation to light touch  LEFT C5: WNL    RIGHT C5: WNL      LEFT C6: WNL       RIGHT C6: WNL      LEFT C7: WNL       RIGHT C7: WNL      LEFT C8: WNL       RIGHT C8: WNL      LEFT T1: WNL       RIGHT T1: WNL    LEFT L1 (lower pelvis/upper thigh): WNL    RIGHT L1: WNL      LEFT L2 (upper thigh): WNL       RIGHT: L2:WNL      LEFT L3 (medial knee): WNL       RIGHT L3: WNL      LEFT L4 (superior medial malleolus): WNL       RIGHT L4: WNL      LEFT L5 (dorsal foot): WNL       RIGHT L5: WNL      LEFT S1 (lateral foot): WNL     RIGHT S1: WNL      LEFT S2 (popliteal fossa): WNL    RIGHT S2: WNL        Motor strength  LEFT C5 (elbow flexion): 5/5   RIGHT C5: 5/5  LEFT C6 (wrist extension): 5/5     RIGHT C6: 5/5  LEFT C7 (elbow extension): 5/5     RIGHT C7: 5/5  LEFT C8 (finger abduction): 5/5     RIGHT C8: 5/5  LEFT T1 (hand ): 5/5     RIGHT T1: 5/5    LEFT L2 (hip flexion): 5/5   RIGHT L2: 5/5  LEFT L3 (knee extension): 5/5     RIGHT L3: 5/5  LEFT L4 (dorsiflexion): 5/5     RIGHT L4: 5/5  LEFT L5 (EHL extension): 5/5     RIGHT L5: 5/5  LEFT S1 (plantarflexion): 5/5     RIGHT S1: 5/5  LEFT S2 (knee flexion): 5/5      RIGHT " "S2: 5/5    Special testing  Pettit: neg BL  DTR unremarkable  Seated slump test neg BL    Psych: affect nl  Skin: no rash/lesions      REVIEW OF LABORATORY DATA  I have reviewed the following lab results:  WBC   Date Value Ref Range Status   08/31/2023 7.4 4.5 - 11.0 K/UL Final     RBC   Date Value Ref Range Status   08/31/2023 3.38 (L) 4.0 - 4.9 M/UL Final     Hemoglobin   Date Value Ref Range Status   08/31/2023 10.6 (L) 12.0 - 15.0 GM/DL Final     Hematocrit   Date Value Ref Range Status   08/31/2023 31.4 (L) 36 - 44 % Final     MCV   Date Value Ref Range Status   08/31/2023 92.9 80 - 100 FL Final     MCH   Date Value Ref Range Status   08/31/2023 31.4 26 - 34 PG Final     MCHC   Date Value Ref Range Status   08/31/2023 33.8 31 - 37 % Final     Platelets   Date Value Ref Range Status   08/31/2023 256 150 - 450 K/UL Final     MPV   Date Value Ref Range Status   08/31/2023 9.8 7.0 - 12.6 CU Final     Sodium   Date Value Ref Range Status   08/31/2023 143 133 - 145 MMOL/L Final     Potassium   Date Value Ref Range Status   08/31/2023 3.7 3.4 - 5.1 MMOL/L Final     Bicarbonate   Date Value Ref Range Status   08/31/2023 22 (L) 24 - 31 MMOL/L Final     Urea Nitrogen   Date Value Ref Range Status   08/31/2023 5 (L) 8 - 25 MG/DL Final     Calcium   Date Value Ref Range Status   08/31/2023 8.5 8.5 - 10.4 MG/DL Final     No results found for: \"PROTIME\", \"PTT\", \"INR\", \"FIBRINOGEN\"      REVIEW OF RADIOLOGY   I have reviewed the following:  Radiology Studies           MRI R tib/fib 1/4/24:  1. Muscle strain of the medial and lateral gastrocnemius musculature  with mild feathery appearing interstitial muscle edema without  myotendinous tear or intramuscular hematoma formation. There is mild  edema deep to the muscle belly in the inter fascial plane with the  soleus muscle. No distal medial head gastrocnemius or soleus  myotendinous tear.      2. No stress reaction or stress fracture within the right  tibia/fibula.        MRI " c-spine 12/26/23:  Alignment of the cervical spine demonstrates mild reversal normal  cervical lordosis. There is no listhesis. Vertebral body heights are  preserved. Disc space heights demonstrate mild loss disc space height  C5/6. Vertebral body signal demonstrates no focal marrow edema.  Spinal cord signal is unremarkable.      Evaluation of spinal levels are as follows:      C2/3 is unremarkable.      C3/4 is unremarkable.      C4/5 is unremarkable.      C5/6 demonstrates mild disc bulge with mild bilateral uncovertebral  joint hypertrophy. Subtle asymmetric component of right paracentral  disc protrusion measuring 1 mm. There is a effacement of the ventral  CSF space without flattening of the cord. No canal stenosis. Foramina  are unremarkable      C6/7 demonstrates central annulus tear. There is no disc herniation  demonstrated. No narrowing of the spinal canal or foramina. Minimal  disc bulge demonstrated.      C7/T1 is unremarkable                      IMPRESSION:  1. Mild disc bulge C5/6 with subtle asymmetric right paracentral disc  protrusion with mild effacement of the ventral CSF space. No  narrowing of the spinal canal.      2. Central annulus tear C6/7 without disc herniation.      3. 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    1) RLE pain  -Since 2020 associated with walking on incline treadmill limiting ability to work out and function, impairing QoL  -Refractive to >3 y conservative tx including Tylenol, NSAIDs, PT, >6 w home exercise tx, cyclobenzaprine  -MR R tib/fib 1/4/24: gastroc strain  -Reviewed/discussed MRI L-spine 2/23/24: multilevel spondylosis featuring left>right L5-S1 perineural cysts at L5 n roots  -Schedule L5-S1 ILESI to target pain generator as seen on imaging and minimize risk/likelihood of chronic opioid use  and/or surgery     2) Neck pain  -Chronic neck pain radiating to R shoulder without objective neuro deficit inhibiting ability to work and maintain QoL  -Refractive to yrs of conservative tx including Tylenol, NSAIDs, PT, >6 w home exercise tx, cyclobenzaprine  -MRI c-spine 12/26/23: multilevel spondylosis featuring C5-6 R-sided disc protrusion effacing dura, C6-7 annulus tear  -RYAN 3/7/24: 50% relief 1 mo  -Schedule rCESI for cumulative effect in targeting pain generator as seen on imaging and minimize risk/likelihood of chronic opioid use and/or surgery  -In meantime, duloxetin 60 mg every day titration          Discussed procedure risks/benefits in detail with patient. Pt meets medical necessity for procedure due to failure of conservative measures. Reviewed procedural risks including bleeding, infection, nerve damage, paralysis. Also reviewed mitigating factors such as screening for infection/blood thinner use, sterile precautions, and image-guidance when applicable. All questions answered. Pt/guardian expressed understanding and choose to proceed           Sonu Butler MD  Anesthesiologist & Interventional Pain Physician   [facility]  O: [phone]  F: [phone]  8:58 AM  05/17/24

## 2024-05-28 ENCOUNTER — TELEPHONE (OUTPATIENT)
Dept: PAIN MEDICINE | Facility: CLINIC | Age: 39
End: 2024-05-28
Payer: COMMERCIAL

## 2024-06-11 ENCOUNTER — ANCILLARY PROCEDURE (OUTPATIENT)
Dept: RADIOLOGY | Facility: EXTERNAL LOCATION | Age: 39
End: 2024-06-11
Payer: COMMERCIAL

## 2024-06-11 DIAGNOSIS — M54.12 RADICULOPATHY, CERVICAL REGION: ICD-10-CM

## 2024-06-11 PROCEDURE — 62321 NJX INTERLAMINAR CRV/THRC: CPT | Performed by: ANESTHESIOLOGY

## 2024-06-11 NOTE — PROGRESS NOTES
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 Patient's pain stable and persistent from last visit.  No personal/family hx issues with anesthesia. Denies allergies to Latex, steroids, local anesthetics, or iodine/contrast. Denies being on blood thinners. Not diabetic.  Denies fever, chills, NS, CP, SOB, cough, N/V.    Discussed procedure risks/benefits in detail with patient. Pt meets medical necessity for procedure due to failure of conservative measures. Reviewed procedural risks including bleeding, infection, nerve damage, paralysis. Also reviewed mitigating factors such as screening for infection/blood thinner use, sterile precautions, and image-guidance when applicable. All questions answered. Pt/guardian expressed understanding and choose to proceed      Procedure:    Cervical Epidural Procedure Note    Procedure: The risks and benefits of treatment options and alternatives were discussed with the patient, and consent was obtained for a cervical epidural steroid injection. She wishes to proceed. She was placed in a prone position. With fluoroscopic assistance, the C7-T1 interspace was identified. After prep with chlorhexidine and 1% lidocaine local infiltration, an 18- gauge Touhy needle was inserted coaxially. Using cautious loss of resistance to saline technique, firm loss of resistance was appreciated at 4.5 cm. Aspiration revealed no cerebrospinal fluid, blood, or air.     1.5 cc of Omnipaque contrast was injected which revealed spread within the epidural space and a normal bilateral epidurogram on fluoroscopy. This was confirmed with AP and  lateral views. After negative aspiration, soft tip catheter advanced without issue. After neg aspiration, additional 1.5 ml contrast injected to show spread up to C4.  2 ml of therapeutic injectate administered here. Catheter then removed with tip intact. After neg aspiration, remaining 2 ml therapeutic injectate administered without issue. Needle re-styletted and removed    Therapeutic injectate: 80 mg triamcinolone + 2 ml NS for total of 4 ml injectate.    Pt monitored with pulse ox, NIBP. See nursing flowsheet for VS record (and sedation record if applicable)    There were no complications, and the patient tolerated the procedure well. There was no numbness or weakness at the time of discharge. She was instructed to await the effects of the steroid over the next several days, to continue with physical therapy as tolerated, and follow up with me 2 weeks.      Sonu Butler MD  Interventional Pain Physician  [facility]  9:28 AM  06/11/24

## 2024-06-20 ENCOUNTER — PATIENT MESSAGE (OUTPATIENT)
Dept: PAIN MEDICINE | Facility: CLINIC | Age: 39
End: 2024-06-20
Payer: COMMERCIAL

## 2024-06-28 ENCOUNTER — TELEPHONE (OUTPATIENT)
Dept: PAIN MEDICINE | Facility: CLINIC | Age: 39
End: 2024-06-28
Payer: COMMERCIAL

## 2024-07-10 ENCOUNTER — OFFICE VISIT (OUTPATIENT)
Dept: PAIN MEDICINE | Facility: CLINIC | Age: 39
End: 2024-07-10
Payer: COMMERCIAL

## 2024-07-10 VITALS
SYSTOLIC BLOOD PRESSURE: 118 MMHG | WEIGHT: 148 LBS | BODY MASS INDEX: 23.78 KG/M2 | HEART RATE: 78 BPM | HEIGHT: 66 IN | DIASTOLIC BLOOD PRESSURE: 78 MMHG | RESPIRATION RATE: 18 BRPM

## 2024-07-10 DIAGNOSIS — M54.16 LUMBAR RADICULITIS: ICD-10-CM

## 2024-07-10 DIAGNOSIS — M54.12 RADICULOPATHY OF CERVICAL REGION: Primary | ICD-10-CM

## 2024-07-10 PROCEDURE — 99214 OFFICE O/P EST MOD 30 MIN: CPT | Performed by: ANESTHESIOLOGY

## 2024-07-10 PROCEDURE — 1036F TOBACCO NON-USER: CPT | Performed by: ANESTHESIOLOGY

## 2024-07-10 RX ORDER — GABAPENTIN 300 MG/1
CAPSULE ORAL
Qty: 90 CAPSULE | Refills: 0 | Status: SHIPPED | OUTPATIENT
Start: 2024-07-10

## 2024-07-10 ASSESSMENT — ENCOUNTER SYMPTOMS
RESPIRATORY NEGATIVE: 1
NEUROLOGICAL NEGATIVE: 1
EYES NEGATIVE: 1
CARDIOVASCULAR NEGATIVE: 1
CONSTITUTIONAL NEGATIVE: 1
NECK PAIN: 1
ENDOCRINE NEGATIVE: 1
BACK PAIN: 1
MYALGIAS: 1
GASTROINTESTINAL NEGATIVE: 1
HEMATOLOGIC/LYMPHATIC NEGATIVE: 1
PSYCHIATRIC NEGATIVE: 1

## 2024-07-10 ASSESSMENT — PAIN - FUNCTIONAL ASSESSMENT: PAIN_FUNCTIONAL_ASSESSMENT: 0-10

## 2024-07-10 ASSESSMENT — PAIN SCALES - GENERAL
PAINLEVEL_OUTOF10: 4
PAINLEVEL: 4

## 2024-07-10 ASSESSMENT — PAIN DESCRIPTION - DESCRIPTORS: DESCRIPTORS: ACHING;BURNING;THROBBING

## 2024-07-10 NOTE — PROGRESS NOTES
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     On 6/11, pt underwent RYAN with 50% relief for <1 w before neck and RUE pain returned to baseline. Stopped duloxetine 2/2 stimulating SE. LBP improved    Pt denies new-onset numbness, weakness, bowel/bladder incontinence.  Pt denies recent infection, allergy to Latex/iodine/contrast. Patient is currently taking the following blood thinner(s): N/A    REVIEW OF SYSTEMS  Review of Systems   Constitutional: Negative.    HENT: Negative.     Eyes: Negative.    Respiratory: Negative.     Cardiovascular: Negative.    Gastrointestinal: Negative.    Endocrine: Negative.    Musculoskeletal:  Positive for back pain, myalgias and neck pain.   Skin: Negative.    Neurological: Negative.    Hematological: Negative.    Psychiatric/Behavioral: Negative.         PAST MEDICAL HISTORY  Past Medical History:   Diagnosis Date    Cervical pain     DDD (degenerative disc disease), cervical      History reviewed. No pertinent surgical history.  Family History   Problem Relation Name Age of Onset    No Known Problems Mother      No Known Problems Father      No Known Problems Maternal Grandfather      Breast cancer Paternal Grandmother      Heart disease Paternal Grandmother         CURRENT MEDICATIONS  Current Outpatient Medications   Medication Sig Dispense Refill    omeprazole (PriLOSEC) 10 mg DR capsule Take 1 capsule (10 mg) by mouth once daily in the morning. Take before meals.      sertraline (Zoloft) 50 mg tablet TAKE ONE TABLET BY MOUTH EVERY DAY 90 tablet 1    diclofenac (Cataflam) 50 mg tablet Take 1 tablet (50 mg) by mouth 3 times a day for 14 days. 42 tablet 0    DULoxetine (Cymbalta) 30 mg DR capsule Do not crush or chew. Take 1 capsule each morning for 1  "week then take 2 capsules each morning thereafter (Patient not taking: Reported on 7/10/2024) 60 capsule 0     No current facility-administered medications for this visit.       ALLERGIES AND DRUG REACTIONS  Allergies   Allergen Reactions    Codeine Itching          OBJECTIVE  Visit Vitals  /78   Pulse 78   Resp 18   Ht 1.676 m (5' 6\")   Wt 67.1 kg (148 lb)   BMI 23.89 kg/m²   OB Status Having periods   Smoking Status Never   BSA 1.77 m²       Last Recorded Pain Score (if available):                Physical Exam  General: Sitting in chair, NAD  Head: NCAT  Eyes: Sclera/conjunctiva clear, EOMI, PERRL  Nose/mouth: MMM  CV: Good distal pulses  Lungs: Good/equal chest excursion  Abdomen: Soft, ND  Ext: No cyanosis/edema  MSK: c-spine alignment: WNL, R paraspinal m TTP, c-spine ROM: full. Neg Spurling, LHermitte    L-spine alignment: WNL, BL paraspinal m TTP, L-spine ROM: full    Neuro: AAOx3   Dermatome sensation to light touch  LEFT C5: WNL    RIGHT C5: WNL      LEFT C6: WNL       RIGHT C6: WNL      LEFT C7: WNL       RIGHT C7: WNL      LEFT C8: WNL       RIGHT C8: WNL      LEFT T1: WNL       RIGHT T1: WNL    Motor strength  LEFT C5 (elbow flexion): 5/5   RIGHT C5: 5/5  LEFT C6 (wrist extension): 5/5     RIGHT C6: 5/5  LEFT C7 (elbow extension): 5/5     RIGHT C7: 5/5  LEFT C8 (finger abduction): 5/5     RIGHT C8: 5/5  LEFT T1 (hand ): 5/5     RIGHT T1: 5/5    Special testing  Pettit: neg BL    Psych: affect nl  Skin: no rash/lesions      REVIEW OF LABORATORY DATA  I have reviewed the following lab results:  WBC   Date Value Ref Range Status   08/31/2023 7.4 4.5 - 11.0 K/UL Final     RBC   Date Value Ref Range Status   08/31/2023 3.38 (L) 4.0 - 4.9 M/UL Final     Hemoglobin   Date Value Ref Range Status   08/31/2023 10.6 (L) 12.0 - 15.0 GM/DL Final     Hematocrit   Date Value Ref Range Status   08/31/2023 31.4 (L) 36 - 44 % Final     MCV   Date Value Ref Range Status   08/31/2023 92.9 80 - 100 FL Final " "    MCH   Date Value Ref Range Status   08/31/2023 31.4 26 - 34 PG Final     MCHC   Date Value Ref Range Status   08/31/2023 33.8 31 - 37 % Final     Platelets   Date Value Ref Range Status   08/31/2023 256 150 - 450 K/UL Final     MPV   Date Value Ref Range Status   08/31/2023 9.8 7.0 - 12.6 CU Final     Sodium   Date Value Ref Range Status   08/31/2023 143 133 - 145 MMOL/L Final     Potassium   Date Value Ref Range Status   08/31/2023 3.7 3.4 - 5.1 MMOL/L Final     Bicarbonate   Date Value Ref Range Status   08/31/2023 22 (L) 24 - 31 MMOL/L Final     Urea Nitrogen   Date Value Ref Range Status   08/31/2023 5 (L) 8 - 25 MG/DL Final     Calcium   Date Value Ref Range Status   08/31/2023 8.5 8.5 - 10.4 MG/DL Final     No results found for: \"PROTIME\", \"PTT\", \"INR\", \"FIBRINOGEN\"      REVIEW OF RADIOLOGY   I have reviewed the following:  Radiology Studies           MRI R tib/fib 1/4/24:  1. Muscle strain of the medial and lateral gastrocnemius musculature  with mild feathery appearing interstitial muscle edema without  myotendinous tear or intramuscular hematoma formation. There is mild  edema deep to the muscle belly in the inter fascial plane with the  soleus muscle. No distal medial head gastrocnemius or soleus  myotendinous tear.      2. No stress reaction or stress fracture within the right  tibia/fibula.        MRI c-spine 12/26/23:  Alignment of the cervical spine demonstrates mild reversal normal  cervical lordosis. There is no listhesis. Vertebral body heights are  preserved. Disc space heights demonstrate mild loss disc space height  C5/6. Vertebral body signal demonstrates no focal marrow edema.  Spinal cord signal is unremarkable.      Evaluation of spinal levels are as follows:      C2/3 is unremarkable.      C3/4 is unremarkable.      C4/5 is unremarkable.      C5/6 demonstrates mild disc bulge with mild bilateral uncovertebral  joint hypertrophy. Subtle asymmetric component of right paracentral  disc " protrusion measuring 1 mm. There is a effacement of the ventral  CSF space without flattening of the cord. No canal stenosis. Foramina  are unremarkable      C6/7 demonstrates central annulus tear. There is no disc herniation  demonstrated. No narrowing of the spinal canal or foramina. Minimal  disc bulge demonstrated.      C7/T1 is unremarkable                      IMPRESSION:  1. Mild disc bulge C5/6 with subtle asymmetric right paracentral disc  protrusion with mild effacement of the ventral CSF space. No  narrowing of the spinal canal.      2. Central annulus tear C6/7 without disc herniation.      3. 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    1) RLE pain, improved  -Since 2020 associated with walking on incline treadmill limiting ability to work out and function, impairing QoL  -Refractive to >3 y conservative tx including Tylenol, NSAIDs, PT, >6 w home exercise tx, cyclobenzaprine  -MR R tib/fib 1/4/24: gastroc strain  -MRI L-spine 2/23/24: multilevel spondylosis featuring left>right L5-S1 perineural cysts at L5 n roots  -Should pain worsen, consider L5-S1 ILESI      2) Neck pain  -Chronic neck pain radiating to R shoulder without objective neuro deficit inhibiting ability to work and maintain QoL  -Refractive to yrs of conservative tx including Tylenol, NSAIDs, PT, >6 w home exercise tx, cyclobenzaprine, duloxetine  -MRI c-spine 12/26/23: multilevel spondylosis featuring C5-6 R-sided disc protrusion effacing dura, C6-7 annulus tear  -RYAN 3/7/24: 50% relief 1 mo  --rCESI 6/11/24: 50% relief <1 w  -Referral to ortho spine Dr Gibbs  -In meantime, gabapentin 300 mg TID titration  -F/U 4-6 w                Sonu Butler MD  Anesthesiologist & Interventional Pain Physician   [facility] [place]  O: [phone]  F:  [phone]  10:16 AM  07/10/24

## 2024-07-19 ENCOUNTER — PATIENT MESSAGE (OUTPATIENT)
Dept: OBSTETRICS AND GYNECOLOGY | Facility: CLINIC | Age: 39
End: 2024-07-19
Payer: COMMERCIAL

## 2024-07-24 NOTE — PATIENT COMMUNICATION
Spoke to pt / pt wanting to know how to obtain records / advised would need to sign a release of records here at [facility] or at the providers office requesting records / pt will stop here to sign release of records / advised that release will then be sent to [employer] an outside company the  has compete records request

## 2024-07-26 ENCOUNTER — LAB (OUTPATIENT)
Dept: LAB | Facility: LAB | Age: 39
End: 2024-07-26
Payer: COMMERCIAL

## 2024-07-26 LAB — COTININE UR QL SCN: NEGATIVE

## 2024-08-19 ENCOUNTER — APPOINTMENT (OUTPATIENT)
Dept: ORTHOPEDIC SURGERY | Facility: CLINIC | Age: 39
End: 2024-08-19
Payer: COMMERCIAL

## 2024-08-28 ASSESSMENT — ENCOUNTER SYMPTOMS
UNEXPECTED WEIGHT CHANGE: 0
CHEST TIGHTNESS: 0
FATIGUE: 0
DIZZINESS: 0
HEADACHES: 0
DIFFICULTY URINATING: 0
ABDOMINAL DISTENTION: 0
COLOR CHANGE: 0
ABDOMINAL PAIN: 0
ACTIVITY CHANGE: 0
JOINT SWELLING: 0
DYSURIA: 0
ADENOPATHY: 0
SHORTNESS OF BREATH: 0
WEAKNESS: 0

## 2024-08-29 ENCOUNTER — OFFICE VISIT (OUTPATIENT)
Dept: OBSTETRICS AND GYNECOLOGY | Facility: CLINIC | Age: 39
End: 2024-08-29
Payer: COMMERCIAL

## 2024-08-29 VITALS — SYSTOLIC BLOOD PRESSURE: 111 MMHG | BODY MASS INDEX: 24.34 KG/M2 | DIASTOLIC BLOOD PRESSURE: 71 MMHG | WEIGHT: 150.8 LBS

## 2024-08-29 DIAGNOSIS — Z01.419 VISIT FOR GYNECOLOGIC EXAMINATION: Primary | ICD-10-CM

## 2024-08-29 DIAGNOSIS — Z80.3 FAMILY HISTORY OF BREAST CANCER IN MOTHER: ICD-10-CM

## 2024-08-29 DIAGNOSIS — N92.6 IRREGULAR MENSES: ICD-10-CM

## 2024-08-29 PROCEDURE — 99395 PREV VISIT EST AGE 18-39: CPT | Performed by: OBSTETRICS & GYNECOLOGY

## 2024-08-29 ASSESSMENT — ENCOUNTER SYMPTOMS
OCCASIONAL FEELINGS OF UNSTEADINESS: 0
DEPRESSION: 0
LOSS OF SENSATION IN FEET: 0

## 2024-08-29 ASSESSMENT — PATIENT HEALTH QUESTIONNAIRE - PHQ9
1. LITTLE INTEREST OR PLEASURE IN DOING THINGS: NOT AT ALL
2. FEELING DOWN, DEPRESSED OR HOPELESS: NOT AT ALL
SUM OF ALL RESPONSES TO PHQ9 QUESTIONS 1 & 2: 0

## 2024-08-29 ASSESSMENT — PAIN SCALES - GENERAL: PAINLEVEL: 0-NO PAIN

## 2024-08-29 NOTE — PROGRESS NOTES
"Annual  Subjective   Nehal Rodriguez is a 39 y.o. female who is here for a routine exam.   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  PMHx: occasional lkg urine;  hx anxiety   Surg Hx: wisdom teeth ; EAB, D and C; right hand knuckle repair  Father: alive, Mother: alive,   Last pap 2023 neg/hpv neg; abn pap 2019 pos HPV 18.   Colpo 2020 bx=chronic cervicitis; 2013.   BCM=Condoms.   Periods : every month ; variable flow   Menarche [age].  Last Period 24  STDs HPV 18/condyloma.  currently sexually active; denies exposure concerns.   Total preg  4.  live births  2. Miscarriage(s)  1. (s)  1.   Pregnancy # 1:  , spontaneous .   Pregnancy # 2:   EAB.   Pregnancy # 3:  2003 MALE \"DEE DEE\", ; lots dental/ mood issues .   Pregnancy # 4:  ; JAVON   Review of Systems   Constitutional:  Negative for activity change, fatigue and unexpected weight change.   Respiratory:  Negative for chest tightness and shortness of breath.    Cardiovascular:  Negative for chest pain and leg swelling.   Gastrointestinal:  Negative for abdominal distention and abdominal pain.   Genitourinary:  Negative for difficulty urinating, dysuria, genital sores, pelvic pain, vaginal bleeding, vaginal discharge and vaginal pain.   Musculoskeletal:  Negative for gait problem and joint swelling.   Skin:  Negative for color change and rash.   Neurological:  Negative for dizziness, weakness and headaches.   Hematological:  Negative for adenopathy.   Objective Visit Vitals  /71   Wt 68.4 kg (150 lb 12.8 oz)   LMP 2024   BMI 24.34 kg/m²   OB Status Having periods   Smoking Status Never   BSA 1.78 m²       General:   Alert and oriented, in no acute distress   Neck: Supple. No visible thyromegaly.    Breast/Axilla: Normal to palpation bilaterally without masses, skin changes, or nipple discharge.    Abdomen: Soft, non-tender, without masses or " organomegaly   Vulva: Normal architecture without erythema, masses, or lesions.    Vagina: Normal mucosa without lesions, masses, or atrophy. No abnormal vaginal discharge.    Cervix: Normal without masses, lesions, or signs of cervicitis   Uterus: Normal, mobile, non-enlarged uterus   Adnexa: Normal without masses or lesions   Pelvic Floor normal   Psych Normal affect. Normal mood.    Assessment/Plan   Encounter Diagnoses   Name Primary?    Visit for gynecologic examination; grossly nl breast/gyn exams; next pap due 2026/hx abnls. Yes    Family history of breast cancer in mother; get mamm by age 40, sooner if chooses to surrogate.     Irregular menses; will check for anatomic lesions w us.     Sheila Vergara MD

## 2024-09-04 ENCOUNTER — HOSPITAL ENCOUNTER (OUTPATIENT)
Dept: RADIOLOGY | Facility: HOSPITAL | Age: 39
Discharge: HOME | End: 2024-09-04
Payer: COMMERCIAL

## 2024-09-04 DIAGNOSIS — N92.6 IRREGULAR MENSES: ICD-10-CM

## 2024-09-04 PROCEDURE — 76856 US EXAM PELVIC COMPLETE: CPT

## 2024-09-04 PROCEDURE — 76830 TRANSVAGINAL US NON-OB: CPT | Performed by: RADIOLOGY

## 2024-09-04 PROCEDURE — 76856 US EXAM PELVIC COMPLETE: CPT | Performed by: RADIOLOGY

## 2024-09-11 ENCOUNTER — APPOINTMENT (OUTPATIENT)
Dept: ENDOCRINOLOGY | Facility: CLINIC | Age: 39
End: 2024-09-11

## 2024-09-18 DIAGNOSIS — F41.1 GENERALIZED ANXIETY DISORDER: ICD-10-CM

## 2024-09-18 RX ORDER — SERTRALINE HYDROCHLORIDE 50 MG/1
50 TABLET, FILM COATED ORAL DAILY
Qty: 90 TABLET | Refills: 0 | Status: SHIPPED | OUTPATIENT
Start: 2024-09-18

## 2024-09-19 ENCOUNTER — APPOINTMENT (OUTPATIENT)
Dept: ORTHOPEDIC SURGERY | Facility: CLINIC | Age: 39
End: 2024-09-19
Payer: COMMERCIAL

## 2024-09-19 DIAGNOSIS — M54.12 RADICULOPATHY OF CERVICAL REGION: ICD-10-CM

## 2024-09-19 PROCEDURE — 99243 OFF/OP CNSLTJ NEW/EST LOW 30: CPT | Performed by: ORTHOPAEDIC SURGERY

## 2024-09-19 NOTE — LETTER
September 19, 2024     Jeanette Thompson PA-C  [address] [facility] [address]  [place] [address]    Patient: Nehal Rdoriguez   YOB: 1985   Date of Visit: 9/19/2024       Dear Dr. Jeanette Thompson PA-C:    Thank you for referring Nehal Rodriguez to me for evaluation. Below are my notes for this consultation.  If you have questions, please do not hesitate to call me. I look forward to following your patient along with you.       Sincerely,     Ramírez Gibbs MD      CC: Sonu Butler MD  ______________________________________________________________________________________    39-year-old female referred for evaluation of chronic neck pain.  She has had pain in her neck for over 10 years.  The pain is always on the right side, mostly in the paraspinal muscle towards the right scapula and trapezius muscle.  She feels some occasional subjective weakness in the right arm, but denies any radicular pain or numbness in the arm.  No bowel or bladder incontinence.    She has been treated over the years with chiropractic care, massage therapy, dry needling.  She went for a couple of sessions of therapy but did not feel that it was beneficial.  She has had 2 epidural injections without any substantial relief.    She is otherwise in very good health for age.  She does not smoke.    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    On exam she is well-appearing and in no distress.  She walks with a normal gait.  No focal neurologic deficits.  No long tract signs or pathologic reflexes.    I personally reviewed MRI of her cervical spine.  This demonstrates reversal of the cervical lordosis.  At C5-6 there is mild left-sided foraminal narrowing.  Otherwise the MRI is unremarkable.    39-year-old female with chronic, axial neck pain.  She does not have any substantial abnormalities related to the right side on her MRI.  Unfortunately from a surgical perspective I do not have anything to offer  her that will help.    I do think she would benefit from ongoing aerobic exercise and muscle strengthening.  There is perhaps a component of underlying generalized ligamentous laxity.    I encouraged her to continue to follow-up with Dr. Butler.  She may benefit from facet blocks, trigger point injections, or other interventions.    I would be happy to see her back on an as-needed basis.    *This note was dictated using speech recognition software and was not corrected for spelling or grammatical errors*    Past Medical History:   Diagnosis Date   • Anxiety    • Cervical pain    • DDD (degenerative disc disease), cervical          Current Outpatient Medications:   •  omeprazole (PriLOSEC) 10 mg DR capsule, Take 1 capsule (10 mg) by mouth once daily in the morning. Take before meals., Disp: , Rfl:   •  sertraline (Zoloft) 50 mg tablet, Take 1 tablet (50 mg) by mouth once daily., Disp: 90 tablet, Rfl: 0     Social History     Socioeconomic History   • Marital status: Single     Spouse name: Not on file   • Number of children: Not on file   • Years of education: Not on file   • Highest education level: Not on file   Occupational History   • Not on file   Tobacco Use   • Smoking status: Never   • Smokeless tobacco: Never   Vaping Use   • Vaping status: Never Used   Substance and Sexual Activity   • Alcohol use: Yes     Alcohol/week: 2.0 standard drinks of alcohol     Types: 2 Standard drinks or equivalent per week   • Drug use: Never   • Sexual activity: Yes     Partners: Male     Birth control/protection: None   Other Topics Concern   • Not on file   Social History Narrative   • Not on file     Social Determinants of Health     Financial Resource Strain: Not on file   Food Insecurity: Not on file   Transportation Needs: Not on file   Physical Activity: Not on file   Stress: Not on file   Social Connections: Not on file   Intimate Partner Violence: Not on file   Housing Stability: Not on file       Ramírez Gibbs  MD  Director, [facility] [place]   Department of Orthopaedic Surgery  [facility]  [address] [place] [address]  [phone]

## 2024-09-19 NOTE — PROGRESS NOTES
39-year-old female referred for evaluation of chronic neck pain.  She has had pain in her neck for over 10 years.  The pain is always on the right side, mostly in the paraspinal muscle towards the right scapula and trapezius muscle.  She feels some occasional subjective weakness in the right arm, but denies any radicular pain or numbness in the arm.  No bowel or bladder incontinence.    She has been treated over the years with chiropractic care, massage therapy, dry needling.  She went for a couple of sessions of therapy but did not feel that it was beneficial.  She has had 2 epidural injections without any substantial relief.    She is otherwise in very good health for age.  She does not smoke.    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    On exam she is well-appearing and in no distress.  She walks with a normal gait.  No focal neurologic deficits.  No long tract signs or pathologic reflexes.    I personally reviewed MRI of her cervical spine.  This demonstrates reversal of the cervical lordosis.  At C5-6 there is mild left-sided foraminal narrowing.  Otherwise the MRI is unremarkable.    39-year-old female with chronic, axial neck pain.  She does not have any substantial abnormalities related to the right side on her MRI.  Unfortunately from a surgical perspective I do not have anything to offer her that will help.    I do think she would benefit from ongoing aerobic exercise and muscle strengthening.  There is perhaps a component of underlying generalized ligamentous laxity.    I encouraged her to continue to follow-up with Dr. Butler.  She may benefit from facet blocks, trigger point injections, or other interventions.    I would be happy to see her back on an as-needed basis.    *This note was dictated using speech recognition software and was not corrected for spelling or grammatical errors*    Past Medical History:   Diagnosis Date    Anxiety     Cervical pain     DDD  (degenerative disc disease), cervical          Current Outpatient Medications:     omeprazole (PriLOSEC) 10 mg DR capsule, Take 1 capsule (10 mg) by mouth once daily in the morning. Take before meals., Disp: , Rfl:     sertraline (Zoloft) 50 mg tablet, Take 1 tablet (50 mg) by mouth once daily., Disp: 90 tablet, Rfl: 0     Social History     Socioeconomic History    Marital status: Single     Spouse name: Not on file    Number of children: Not on file    Years of education: Not on file    Highest education level: Not on file   Occupational History    Not on file   Tobacco Use    Smoking status: Never    Smokeless tobacco: Never   Vaping Use    Vaping status: Never Used   Substance and Sexual Activity    Alcohol use: Yes     Alcohol/week: 2.0 standard drinks of alcohol     Types: 2 Standard drinks or equivalent per week    Drug use: Never    Sexual activity: Yes     Partners: Male     Birth control/protection: None   Other Topics Concern    Not on file   Social History Narrative    Not on file     Social Determinants of Health     Financial Resource Strain: Not on file   Food Insecurity: Not on file   Transportation Needs: Not on file   Physical Activity: Not on file   Stress: Not on file   Social Connections: Not on file   Intimate Partner Violence: Not on file   Housing Stability: Not on file       Ramírez Gibbs MD  Director, [facility] [place]   Department of Orthopaedic Surgery  [facility]  [address]  [place] [id number]  [phone]

## 2024-09-27 PROCEDURE — RXMED WILLOW AMBULATORY MEDICATION CHARGE

## 2024-10-01 ENCOUNTER — PHARMACY VISIT (OUTPATIENT)
Dept: PHARMACY | Facility: CLINIC | Age: 39
End: 2024-10-01
Payer: COMMERCIAL

## 2024-10-03 ENCOUNTER — E-VISIT (OUTPATIENT)
Dept: PRIMARY CARE | Facility: CLINIC | Age: 39
End: 2024-10-03
Payer: COMMERCIAL

## 2024-10-09 ENCOUNTER — APPOINTMENT (OUTPATIENT)
Dept: PRIMARY CARE | Facility: CLINIC | Age: 39
End: 2024-10-09
Payer: COMMERCIAL

## 2024-10-18 ENCOUNTER — OFFICE VISIT (OUTPATIENT)
Dept: PRIMARY CARE | Facility: CLINIC | Age: 39
End: 2024-10-18
Payer: COMMERCIAL

## 2024-10-18 ENCOUNTER — TELEPHONE (OUTPATIENT)
Dept: PRIMARY CARE | Facility: CLINIC | Age: 39
End: 2024-10-18

## 2024-10-18 ENCOUNTER — LAB (OUTPATIENT)
Dept: LAB | Facility: LAB | Age: 39
End: 2024-10-18
Payer: COMMERCIAL

## 2024-10-18 VITALS
HEART RATE: 88 BPM | WEIGHT: 149 LBS | DIASTOLIC BLOOD PRESSURE: 68 MMHG | HEIGHT: 66 IN | SYSTOLIC BLOOD PRESSURE: 118 MMHG | BODY MASS INDEX: 23.95 KG/M2 | OXYGEN SATURATION: 98 %

## 2024-10-18 DIAGNOSIS — E53.8 VITAMIN B12 DEFICIENCY: ICD-10-CM

## 2024-10-18 DIAGNOSIS — D64.9 ANEMIA, UNSPECIFIED TYPE: ICD-10-CM

## 2024-10-18 DIAGNOSIS — R53.83 FATIGUE, UNSPECIFIED TYPE: ICD-10-CM

## 2024-10-18 DIAGNOSIS — E55.9 VITAMIN D DEFICIENCY: ICD-10-CM

## 2024-10-18 DIAGNOSIS — Z00.00 ANNUAL PHYSICAL EXAM: Primary | ICD-10-CM

## 2024-10-18 DIAGNOSIS — Z13.1 SCREENING FOR DIABETES MELLITUS: ICD-10-CM

## 2024-10-18 LAB
25(OH)D3 SERPL-MCNC: 61 NG/ML (ref 30–100)
BASOPHILS # BLD AUTO: 0.05 X10*3/UL (ref 0–0.1)
BASOPHILS NFR BLD AUTO: 0.8 %
EOSINOPHIL # BLD AUTO: 0.08 X10*3/UL (ref 0–0.7)
EOSINOPHIL NFR BLD AUTO: 1.4 %
ERYTHROCYTE [DISTWIDTH] IN BLOOD BY AUTOMATED COUNT: 12 % (ref 11.5–14.5)
FERRITIN SERPL-MCNC: 38 NG/ML (ref 8–150)
HCT VFR BLD AUTO: 41 % (ref 36–46)
HGB BLD-MCNC: 13.5 G/DL (ref 12–16)
IMM GRANULOCYTES # BLD AUTO: 0.01 X10*3/UL (ref 0–0.7)
IMM GRANULOCYTES NFR BLD AUTO: 0.2 % (ref 0–0.9)
IRON SATN MFR SERPL: 21 % (ref 25–45)
IRON SERPL-MCNC: 79 UG/DL (ref 35–150)
LYMPHOCYTES # BLD AUTO: 2.39 X10*3/UL (ref 1.2–4.8)
LYMPHOCYTES NFR BLD AUTO: 40.4 %
MCH RBC QN AUTO: 31.1 PG (ref 26–34)
MCHC RBC AUTO-ENTMCNC: 32.9 G/DL (ref 32–36)
MCV RBC AUTO: 95 FL (ref 80–100)
MONOCYTES # BLD AUTO: 0.48 X10*3/UL (ref 0.1–1)
MONOCYTES NFR BLD AUTO: 8.1 %
NEUTROPHILS # BLD AUTO: 2.91 X10*3/UL (ref 1.2–7.7)
NEUTROPHILS NFR BLD AUTO: 49.1 %
NRBC BLD-RTO: 0 /100 WBCS (ref 0–0)
PLATELET # BLD AUTO: 326 X10*3/UL (ref 150–450)
RBC # BLD AUTO: 4.34 X10*6/UL (ref 4–5.2)
TIBC SERPL-MCNC: 383 UG/DL (ref 240–445)
TSH SERPL-ACNC: 0.81 MIU/L (ref 0.44–3.98)
UIBC SERPL-MCNC: 304 UG/DL (ref 110–370)
VIT B12 SERPL-MCNC: 369 PG/ML (ref 211–911)
WBC # BLD AUTO: 5.9 X10*3/UL (ref 4.4–11.3)

## 2024-10-18 PROCEDURE — 82306 VITAMIN D 25 HYDROXY: CPT

## 2024-10-18 PROCEDURE — 3008F BODY MASS INDEX DOCD: CPT

## 2024-10-18 PROCEDURE — 36415 COLL VENOUS BLD VENIPUNCTURE: CPT

## 2024-10-18 PROCEDURE — 82607 VITAMIN B-12: CPT

## 2024-10-18 PROCEDURE — 82728 ASSAY OF FERRITIN: CPT

## 2024-10-18 PROCEDURE — 83550 IRON BINDING TEST: CPT

## 2024-10-18 PROCEDURE — 99395 PREV VISIT EST AGE 18-39: CPT

## 2024-10-18 PROCEDURE — 1036F TOBACCO NON-USER: CPT

## 2024-10-18 PROCEDURE — 84443 ASSAY THYROID STIM HORMONE: CPT

## 2024-10-18 PROCEDURE — 83540 ASSAY OF IRON: CPT

## 2024-10-18 PROCEDURE — 85025 COMPLETE CBC W/AUTO DIFF WBC: CPT

## 2024-10-18 ASSESSMENT — ENCOUNTER SYMPTOMS
WHEEZING: 0
ARTHRALGIAS: 1
LIGHT-HEADEDNESS: 0
VOMITING: 0
DIFFICULTY URINATING: 0
FATIGUE: 1
DIAPHORESIS: 0
HEMATURIA: 0
MYALGIAS: 1
DIARRHEA: 0
ADENOPATHY: 0
NERVOUS/ANXIOUS: 1
NAUSEA: 0
SORE THROAT: 0
PALPITATIONS: 0
DYSURIA: 0
DYSPHORIC MOOD: 1
BLOOD IN STOOL: 0
SHORTNESS OF BREATH: 0
NECK PAIN: 1
COUGH: 0
FEVER: 0

## 2024-10-18 ASSESSMENT — PAIN SCALES - GENERAL: PAINLEVEL_OUTOF10: 3

## 2024-10-18 NOTE — PROGRESS NOTES
Subjective   Patient ID: Nehal Rodriguez is a 39 y.o. female who presents for Annual Exam (No concerns /la).    Hx of CHRISTIN    Other providers: Dr. Vergara (ob/gyn). Dr. Butler (pain management, recently weaned herself off everything, and stopped epidural injections).     CHRISTIN: patient was on Zoloft, had issues with filling the prescription, then forgot about it, and was off for a few weeks. Patient wants a break for all medications, was also on a lot of medication through pain management and stopped those well. However anxiety and mood is not controlled. Patient in last year had very big family dynamic change and is struggling with change. Some days she is fine and other days anxiety/mood is debilitating and she just lays on cough. Does not want daily medication right now. Open to therapy but struggles with finding one covered by insurance and making effort to find therapist she likes.      HM: PAP 8/24/2023. Mammogram, start 40. Colon screening start 45. Lung screening never smoker. DEXA start 65. Vaccinations flu UTD, Tdap 2019                 Review of Systems   Constitutional:  Positive for fatigue. Negative for diaphoresis and fever.   HENT:  Negative for congestion, hearing loss and sore throat.    Eyes:  Negative for visual disturbance.   Respiratory:  Negative for cough, shortness of breath and wheezing.    Cardiovascular:  Negative for chest pain, palpitations and leg swelling.   Gastrointestinal:  Negative for blood in stool, diarrhea, nausea and vomiting.   Genitourinary:  Negative for difficulty urinating, dysuria and hematuria.   Musculoskeletal:  Positive for arthralgias, myalgias and neck pain.   Skin:  Negative for rash.   Allergic/Immunologic: Negative for immunocompromised state.   Neurological:  Negative for syncope and light-headedness.   Hematological:  Negative for adenopathy.   Psychiatric/Behavioral:  Positive for dysphoric mood. The patient is nervous/anxious.        Objective   BP  "118/68   Pulse 88   Ht 1.676 m (5' 6\")   Wt 67.6 kg (149 lb)   SpO2 98%   BMI 24.05 kg/m²     Physical Exam  Constitutional:       General: She is not in acute distress.     Appearance: Normal appearance.   HENT:      Right Ear: Tympanic membrane and ear canal normal.      Left Ear: Tympanic membrane and ear canal normal.      Nose: Nose normal.      Mouth/Throat:      Mouth: Mucous membranes are moist.      Pharynx: Oropharynx is clear. No oropharyngeal exudate or posterior oropharyngeal erythema.   Eyes:      Extraocular Movements: Extraocular movements intact.      Conjunctiva/sclera: Conjunctivae normal.      Pupils: Pupils are equal, round, and reactive to light.   Neck:      Thyroid: No thyroid mass or thyromegaly.   Cardiovascular:      Rate and Rhythm: Normal rate and regular rhythm.      Pulses: Normal pulses.      Heart sounds: No murmur heard.     No gallop.   Pulmonary:      Effort: Pulmonary effort is normal.      Breath sounds: No wheezing, rhonchi or rales.   Abdominal:      General: Bowel sounds are normal.      Palpations: Abdomen is soft. There is no hepatomegaly, splenomegaly or mass.      Tenderness: There is no abdominal tenderness. There is no guarding.   Musculoskeletal:         General: Normal range of motion.      Right lower leg: No edema.      Left lower leg: No edema.   Lymphadenopathy:      Cervical: No cervical adenopathy.   Skin:     General: Skin is warm.      Findings: No rash.   Neurological:      General: No focal deficit present.      Mental Status: She is alert.      Motor: Motor function is intact. No weakness.   Psychiatric:         Mood and Affect: Mood normal. Affect is tearful.         Thought Content: Thought content normal.         Assessment/Plan   Diagnoses and all orders for this visit:  Annual physical exam  Anemia, unspecified type  -     Iron and TIBC; Future  -     Ferritin; Future  -     CBC and Auto Differential; Future  Screening for diabetes " mellitus  Vitamin D deficiency  -     Vitamin D 25-Hydroxy,Total (for eval of Vitamin D levels); Future  Vitamin B12 deficiency  -     Vitamin B12; Future  Fatigue, unspecified type  -     Tsh With Reflex To Free T4 If Abnormal; Future  Given Eileen Merlyn card to consider neurokinetic PT for MSK pain. Also given therapist list and name suggestions. Discussed Wellbutrin and re-started SSRI, but patient would like to hole off for now, will call if she changes her mind. Follow-up 3 months for mood.         Non-Graft Cartilage Fenestration Text: The cartilage was fenestrated with a 2mm punch biopsy to help facilitate healing.

## 2024-10-18 NOTE — TELEPHONE ENCOUNTER
All labs normal, but Vitamin b12 was in lower range, would not hurt to try a Vitamin b12 supplement plus a magnesium supplement below are top 2 I would have patient consider trying.       MAGNESIUM GLYCINATE is well absorbed and can help reduce stress, anxiety, depression, PMS and insomnia. This is the best type of magnesium to help with sleep and should be taken in the evening. Can help for those with migraines. It does not help with constipation.      MAGNESIUM MALATE improves energy and muscle pain, it is optimal for fibromyalgia relief, chronic fatigue syndrome and tense muscles. It is well absorbed and best to take in the morning.    I recommend getting your magnesium on Amazon or locally at a store that carries the various magnesium supplements.

## 2025-01-20 ENCOUNTER — TELEMEDICINE (OUTPATIENT)
Dept: PRIMARY CARE | Facility: CLINIC | Age: 40
End: 2025-01-20
Payer: COMMERCIAL

## 2025-01-20 VITALS — BODY MASS INDEX: 24.43 KG/M2 | WEIGHT: 152 LBS | HEIGHT: 66 IN

## 2025-01-20 DIAGNOSIS — F41.1 GENERALIZED ANXIETY DISORDER: Primary | ICD-10-CM

## 2025-01-20 PROCEDURE — 3008F BODY MASS INDEX DOCD: CPT

## 2025-01-20 PROCEDURE — 99212 OFFICE O/P EST SF 10 MIN: CPT

## 2025-01-20 PROCEDURE — 1036F TOBACCO NON-USER: CPT

## 2025-01-20 RX ORDER — GABAPENTIN 300 MG/1
300 CAPSULE ORAL 3 TIMES DAILY
COMMUNITY

## 2025-01-20 ASSESSMENT — ENCOUNTER SYMPTOMS
NERVOUS/ANXIOUS: 0
CHILLS: 0
SLEEP DISTURBANCE: 0
DYSPHORIC MOOD: 0
FEVER: 0

## 2025-01-20 ASSESSMENT — PAIN SCALES - GENERAL: PAINLEVEL_OUTOF10: 0-NO PAIN

## 2025-01-20 NOTE — PROGRESS NOTES
"Subjective   Patient ID: Nehal Rodriguez is a 39 y.o. female who presents for Follow-up.    With patient's permission, this is a Telemedicine visit with video and audio. All issues as documented below were discussed and addressed but limited physical exam was performed. If it was felt that the patient should be evaluated via face-to-face office appointment(s) they were directed there.       Hx of CHRISTIN, cervical radiclopathy    Other providers: Dr. Vergara (ob/gyn). Dr. Butler (pain management, recently weaned herself off everything, and stopped epidural injections).     CHRISTIN: controlled, patient was on Zoloft previously but stopped. Last visit discussed starting Wellbutrin and re-started SSRI, but patient wants a break for all medications. Anxiety and mood has been significantly better, she is back in the gym working out and family issues/drama calming down. She did start seeing therapist but was too expensive and insurance said she would have to meet deductible, before they would pay for therapy, so stopped.                Review of Systems   Constitutional:  Negative for chills and fever.   Psychiatric/Behavioral:  Negative for dysphoric mood and sleep disturbance. The patient is not nervous/anxious.        Objective   Ht 1.676 m (5' 6\")   Wt 68.9 kg (152 lb)   BMI 24.53 kg/m²     Physical Exam  Constitutional:       Appearance: Normal appearance.   Pulmonary:      Effort: Pulmonary effort is normal.   Skin:     Findings: No rash.   Neurological:      Mental Status: She is alert.   Psychiatric:         Mood and Affect: Mood and affect normal.         Assessment/Plan   Diagnoses and all orders for this visit:  Generalized anxiety disorder  Stable now, stay off anxiety medications, follow-up 10/2024 for CPE.        "

## 2025-02-12 ENCOUNTER — OFFICE VISIT (OUTPATIENT)
Dept: PAIN MEDICINE | Facility: CLINIC | Age: 40
End: 2025-02-12
Payer: COMMERCIAL

## 2025-02-12 ENCOUNTER — HOSPITAL ENCOUNTER (OUTPATIENT)
Dept: RADIOLOGY | Facility: CLINIC | Age: 40
Discharge: HOME | End: 2025-02-12
Payer: COMMERCIAL

## 2025-02-12 VITALS
WEIGHT: 152 LBS | SYSTOLIC BLOOD PRESSURE: 97 MMHG | DIASTOLIC BLOOD PRESSURE: 65 MMHG | BODY MASS INDEX: 24.43 KG/M2 | RESPIRATION RATE: 16 BRPM | HEIGHT: 66 IN | HEART RATE: 80 BPM

## 2025-02-12 DIAGNOSIS — M54.16 LUMBAR RADICULITIS: ICD-10-CM

## 2025-02-12 DIAGNOSIS — G89.29 CHRONIC RIGHT SHOULDER PAIN: Primary | ICD-10-CM

## 2025-02-12 DIAGNOSIS — M54.12 CERVICAL RADICULITIS: ICD-10-CM

## 2025-02-12 DIAGNOSIS — M25.511 CHRONIC RIGHT SHOULDER PAIN: Primary | ICD-10-CM

## 2025-02-12 DIAGNOSIS — M47.812 CERVICAL SPONDYLOSIS: ICD-10-CM

## 2025-02-12 PROCEDURE — 73030 X-RAY EXAM OF SHOULDER: CPT | Mod: RT

## 2025-02-12 PROCEDURE — 99214 OFFICE O/P EST MOD 30 MIN: CPT | Performed by: ANESTHESIOLOGY

## 2025-02-12 PROCEDURE — 3008F BODY MASS INDEX DOCD: CPT | Performed by: ANESTHESIOLOGY

## 2025-02-12 PROCEDURE — G2211 COMPLEX E/M VISIT ADD ON: HCPCS | Performed by: ANESTHESIOLOGY

## 2025-02-12 ASSESSMENT — ENCOUNTER SYMPTOMS
HEMATOLOGIC/LYMPHATIC NEGATIVE: 1
GASTROINTESTINAL NEGATIVE: 1
CARDIOVASCULAR NEGATIVE: 1
MYALGIAS: 1
NEUROLOGICAL NEGATIVE: 1
RESPIRATORY NEGATIVE: 1
PSYCHIATRIC NEGATIVE: 1
EYES NEGATIVE: 1
CONSTITUTIONAL NEGATIVE: 1
NECK PAIN: 1
BACK PAIN: 1
ENDOCRINE NEGATIVE: 1

## 2025-02-12 ASSESSMENT — PAIN - FUNCTIONAL ASSESSMENT: PAIN_FUNCTIONAL_ASSESSMENT: 0-10

## 2025-02-12 ASSESSMENT — PAIN DESCRIPTION - DESCRIPTORS: DESCRIPTORS: ACHING;BURNING;TIGHTNESS

## 2025-02-12 ASSESSMENT — PAIN SCALES - GENERAL
PAINLEVEL_OUTOF10: 4
PAINLEVEL_OUTOF10: 4

## 2025-02-12 NOTE — PROGRESS NOTES
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     Since her last visit, pt reviews worsening R shoulder blade pain last 6 w. Tried Tylenol, NSAIDs, gabapentin, ice, rest, heat, MDP without sustained relief.     Pt denies new-onset numbness, weakness, bowel/bladder incontinence.  Pt denies recent infection, allergy to Latex/iodine/contrast. Patient is currently taking the following blood thinner(s): N/A    REVIEW OF SYSTEMS  Review of Systems   Constitutional: Negative.    HENT: Negative.     Eyes: Negative.    Respiratory: Negative.     Cardiovascular: Negative.    Gastrointestinal: Negative.    Endocrine: Negative.    Musculoskeletal:  Positive for back pain, myalgias and neck pain.   Skin: Negative.    Neurological: Negative.    Hematological: Negative.    Psychiatric/Behavioral: Negative.         PAST MEDICAL HISTORY  Past Medical History:   Diagnosis Date    Anxiety     Cervical pain     DDD (degenerative disc disease), cervical      History reviewed. No pertinent surgical history.  Family History   Problem Relation Name Age of Onset    Breast cancer Mother Mom     No Known Problems Father      No Known Problems Maternal Grandfather         CURRENT MEDICATIONS  Current Outpatient Medications   Medication Sig Dispense Refill    gabapentin (Neurontin) 300 mg capsule Take 1 capsule (300 mg) by mouth 3 times a day.       No current facility-administered medications for this visit.       ALLERGIES AND DRUG REACTIONS  Allergies   Allergen Reactions    Codeine Itching          OBJECTIVE  Visit Vitals  BP 97/65   Pulse 80   Resp 16   Ht 1.676 m (5' 6\")   Wt 68.9 kg (152 lb)   BMI 24.53 kg/m²   OB Status Having periods   Smoking Status Never   BSA 1.79 m²       Last Recorded Pain Score (if available):         Pain " Score:   4       Physical Exam  General: Sitting in chair, NAD  Head: NCAT  Eyes: Sclera/conjunctiva clear, EOMI, PERRL  Nose/mouth: MMM  CV: Good distal pulses  Lungs: Good/equal chest excursion  Abdomen: Soft, ND  Ext: No cyanosis/edema  MSK: c-spine alignment: WNL, R paraspinal m TTP, c-spine ROM: full. Neg Spurling, LHermitte  R shoulder passive/active ROM full. Neg Hawken, empty can    Neuro: AAOx3, CN grossly nl  Dermatome sensation to light touch  LEFT C5: WNL    RIGHT C5: WNL      LEFT C6: WNL       RIGHT C6: WNL      LEFT C7: WNL       RIGHT C7: WNL      LEFT C8: WNL       RIGHT C8: WNL      LEFT T1: WNL       RIGHT T1: WNL    Motor strength  LEFT C5 (elbow flexion): 5/5   RIGHT C5: 5/5  LEFT C6 (wrist extension): 5/5     RIGHT C6: 5/5  LEFT C7 (elbow extension): 5/5     RIGHT C7: 5/5  LEFT C8 (finger abduction): 5/5     RIGHT C8: 5/5  LEFT T1 (hand ): 5/5     RIGHT T1: 5/5    Special testing  Pettit: neg BL    Psych: affect nl  Skin: no rash/lesions      REVIEW OF LABORATORY DATA  I have reviewed the following lab results:  WBC   Date Value Ref Range Status   10/18/2024 5.9 4.4 - 11.3 x10*3/uL Final     RBC   Date Value Ref Range Status   10/18/2024 4.34 4.00 - 5.20 x10*6/uL Final     Hemoglobin   Date Value Ref Range Status   10/18/2024 13.5 12.0 - 16.0 g/dL Final     Hematocrit   Date Value Ref Range Status   10/18/2024 41.0 36.0 - 46.0 % Final     MCV   Date Value Ref Range Status   10/18/2024 95 80 - 100 fL Final     MCH   Date Value Ref Range Status   10/18/2024 31.1 26.0 - 34.0 pg Final     MCHC   Date Value Ref Range Status   10/18/2024 32.9 32.0 - 36.0 g/dL Final     RDW   Date Value Ref Range Status   10/18/2024 12.0 11.5 - 14.5 % Final     Platelets   Date Value Ref Range Status   10/18/2024 326 150 - 450 x10*3/uL Final     MPV   Date Value Ref Range Status   08/31/2023 9.8 7.0 - 12.6 CU Final     Sodium   Date Value Ref Range Status   08/31/2023 143 133 - 145 MMOL/L Final     Potassium  "  Date Value Ref Range Status   08/31/2023 3.7 3.4 - 5.1 MMOL/L Final     Bicarbonate   Date Value Ref Range Status   08/31/2023 22 (L) 24 - 31 MMOL/L Final     Urea Nitrogen   Date Value Ref Range Status   08/31/2023 5 (L) 8 - 25 MG/DL Final     Calcium   Date Value Ref Range Status   08/31/2023 8.5 8.5 - 10.4 MG/DL Final     No results found for: \"PROTIME\", \"PTT\", \"INR\", \"FIBRINOGEN\"      REVIEW OF RADIOLOGY   I have reviewed the following:  Radiology Studies           MRI R tib/fib 1/4/24:  1. Muscle strain of the medial and lateral gastrocnemius musculature  with mild feathery appearing interstitial muscle edema without  myotendinous tear or intramuscular hematoma formation. There is mild  edema deep to the muscle belly in the inter fascial plane with the  soleus muscle. No distal medial head gastrocnemius or soleus  myotendinous tear.      2. No stress reaction or stress fracture within the right  tibia/fibula.        MRI c-spine 12/26/23:  Alignment of the cervical spine demonstrates mild reversal normal  cervical lordosis. There is no listhesis. Vertebral body heights are  preserved. Disc space heights demonstrate mild loss disc space height  C5/6. Vertebral body signal demonstrates no focal marrow edema.  Spinal cord signal is unremarkable.      Evaluation of spinal levels are as follows:      C2/3 is unremarkable.      C3/4 is unremarkable.      C4/5 is unremarkable.      C5/6 demonstrates mild disc bulge with mild bilateral uncovertebral  joint hypertrophy. Subtle asymmetric component of right paracentral  disc protrusion measuring 1 mm. There is a effacement of the ventral  CSF space without flattening of the cord. No canal stenosis. Foramina  are unremarkable      C6/7 demonstrates central annulus tear. There is no disc herniation  demonstrated. No narrowing of the spinal canal or foramina. Minimal  disc bulge demonstrated.      C7/T1 is unremarkable                      IMPRESSION:  1. Mild disc bulge " C5/6 with subtle asymmetric right paracentral disc  protrusion with mild effacement of the ventral CSF space. No  narrowing of the spinal canal.      2. Central annulus tear C6/7 without disc herniation.      3. 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    1) RLE pain, improved  -Since 2020 associated with walking on incline treadmill limiting ability to work out and function, impairing QoL  -Refractive to >3 y conservative tx including Tylenol, NSAIDs, PT, >6 w home exercise tx, cyclobenzaprine  -MR R tib/fib 1/4/24: gastroc strain  -MRI L-spine 2/23/24: multilevel spondylosis featuring left>right L5-S1 perineural cysts at L5 n roots  -Should pain worsen, consider L5-S1 ILESI      2) Neck pain  -Chronic neck pain radiating to R shoulder without objective neuro deficit inhibiting ability to work and maintain QoL  -Refractive to yrs of conservative tx including Tylenol, NSAIDs, PT, >6 w home exercise tx, cyclobenzaprine, duloxetine, ice, heat, gabapentin, MDP  -MRI c-spine 12/26/23: multilevel spondylosis featuring C5-6 R-sided disc protrusion effacing dura, C6-7 annulus tear  -RYAN 3/7/24: 50% relief 1 mo  --rCESI 6/11/24: 50% relief <1 w  -Reviewed/discussed ortho spine Dr Gibbs note 9/19/24 where surgery was not rec'd. Advised aerobic exercise, m strengthening- noted possible ligamentous laxity. Suggested facet blocks  -XR R shoulder to r/o shoulder etiology  -EMG BUE to elucidate neuropathic sx  -Schedule prognostic R C3-4, C4-5 MBNB under fluoro with LA only to assess candidacy for RFA  -Consider Botox      Discussed procedure risks/benefits in detail with patient. Pt meets medical necessity for procedure due to failure of conservative measures. Reviewed procedural risks including bleeding, infection, nerve damage, paralysis. Also reviewed mitigating factors such  as screening for infection/blood thinner use, sterile precautions, and image-guidance when applicable. All questions answered. Pt/guardian expressed understanding and choose to proceed    Today's visit involved continuation of chronic pain care. In the context of the complexity of this patient's chronic pain diagnosis, long-term expectations and care planning discussed. Adequate time taken to ensure patient understanding and answer questions. Imaging studies ordered are placed do elucidate the patient's diagnosis, but also to evaluate the patient's candidacy for procedural and surgical interventions. The risks and benefits of these potential interventions are detailed as above.             Sonu Butler MD  Anesthesiologist & Interventional Pain Physician   [facility] [place]  O: [phone]  F: [phone]  2:21 PM  02/12/25

## 2025-02-12 NOTE — H&P (VIEW-ONLY)
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     Since her last visit, pt reviews worsening R shoulder blade pain last 6 w. Tried Tylenol, NSAIDs, gabapentin, ice, rest, heat, MDP without sustained relief.     Pt denies new-onset numbness, weakness, bowel/bladder incontinence.  Pt denies recent infection, allergy to Latex/iodine/contrast. Patient is currently taking the following blood thinner(s): N/A    REVIEW OF SYSTEMS  Review of Systems   Constitutional: Negative.    HENT: Negative.     Eyes: Negative.    Respiratory: Negative.     Cardiovascular: Negative.    Gastrointestinal: Negative.    Endocrine: Negative.    Musculoskeletal:  Positive for back pain, myalgias and neck pain.   Skin: Negative.    Neurological: Negative.    Hematological: Negative.    Psychiatric/Behavioral: Negative.         PAST MEDICAL HISTORY  Past Medical History:   Diagnosis Date    Anxiety     Cervical pain     DDD (degenerative disc disease), cervical      History reviewed. No pertinent surgical history.  Family History   Problem Relation Name Age of Onset    Breast cancer Mother Mom     No Known Problems Father      No Known Problems Maternal Grandfather         CURRENT MEDICATIONS  Current Outpatient Medications   Medication Sig Dispense Refill    gabapentin (Neurontin) 300 mg capsule Take 1 capsule (300 mg) by mouth 3 times a day.       No current facility-administered medications for this visit.       ALLERGIES AND DRUG REACTIONS  Allergies   Allergen Reactions    Codeine Itching          OBJECTIVE  Visit Vitals  BP 97/65   Pulse 80   Resp 16   Ht 1.676 m (5' 6\")   Wt 68.9 kg (152 lb)   BMI 24.53 kg/m²   OB Status Having periods   Smoking Status Never   BSA 1.79 m²       Last Recorded Pain Score (if available):         Pain " Score:   4       Physical Exam  General: Sitting in chair, NAD  Head: NCAT  Eyes: Sclera/conjunctiva clear, EOMI, PERRL  Nose/mouth: MMM  CV: Good distal pulses  Lungs: Good/equal chest excursion  Abdomen: Soft, ND  Ext: No cyanosis/edema  MSK: c-spine alignment: WNL, R paraspinal m TTP, c-spine ROM: full. Neg Spurling, LHermitte  R shoulder passive/active ROM full. Neg Hawken, empty can    Neuro: AAOx3, CN grossly nl  Dermatome sensation to light touch  LEFT C5: WNL    RIGHT C5: WNL      LEFT C6: WNL       RIGHT C6: WNL      LEFT C7: WNL       RIGHT C7: WNL      LEFT C8: WNL       RIGHT C8: WNL      LEFT T1: WNL       RIGHT T1: WNL    Motor strength  LEFT C5 (elbow flexion): 5/5   RIGHT C5: 5/5  LEFT C6 (wrist extension): 5/5     RIGHT C6: 5/5  LEFT C7 (elbow extension): 5/5     RIGHT C7: 5/5  LEFT C8 (finger abduction): 5/5     RIGHT C8: 5/5  LEFT T1 (hand ): 5/5     RIGHT T1: 5/5    Special testing  Pettit: neg BL    Psych: affect nl  Skin: no rash/lesions      REVIEW OF LABORATORY DATA  I have reviewed the following lab results:  WBC   Date Value Ref Range Status   10/18/2024 5.9 4.4 - 11.3 x10*3/uL Final     RBC   Date Value Ref Range Status   10/18/2024 4.34 4.00 - 5.20 x10*6/uL Final     Hemoglobin   Date Value Ref Range Status   10/18/2024 13.5 12.0 - 16.0 g/dL Final     Hematocrit   Date Value Ref Range Status   10/18/2024 41.0 36.0 - 46.0 % Final     MCV   Date Value Ref Range Status   10/18/2024 95 80 - 100 fL Final     MCH   Date Value Ref Range Status   10/18/2024 31.1 26.0 - 34.0 pg Final     MCHC   Date Value Ref Range Status   10/18/2024 32.9 32.0 - 36.0 g/dL Final     RDW   Date Value Ref Range Status   10/18/2024 12.0 11.5 - 14.5 % Final     Platelets   Date Value Ref Range Status   10/18/2024 326 150 - 450 x10*3/uL Final     MPV   Date Value Ref Range Status   08/31/2023 9.8 7.0 - 12.6 CU Final     Sodium   Date Value Ref Range Status   08/31/2023 143 133 - 145 MMOL/L Final     Potassium  "  Date Value Ref Range Status   08/31/2023 3.7 3.4 - 5.1 MMOL/L Final     Bicarbonate   Date Value Ref Range Status   08/31/2023 22 (L) 24 - 31 MMOL/L Final     Urea Nitrogen   Date Value Ref Range Status   08/31/2023 5 (L) 8 - 25 MG/DL Final     Calcium   Date Value Ref Range Status   08/31/2023 8.5 8.5 - 10.4 MG/DL Final     No results found for: \"PROTIME\", \"PTT\", \"INR\", \"FIBRINOGEN\"      REVIEW OF RADIOLOGY   I have reviewed the following:  Radiology Studies           MRI R tib/fib 1/4/24:  1. Muscle strain of the medial and lateral gastrocnemius musculature  with mild feathery appearing interstitial muscle edema without  myotendinous tear or intramuscular hematoma formation. There is mild  edema deep to the muscle belly in the inter fascial plane with the  soleus muscle. No distal medial head gastrocnemius or soleus  myotendinous tear.      2. No stress reaction or stress fracture within the right  tibia/fibula.        MRI c-spine 12/26/23:  Alignment of the cervical spine demonstrates mild reversal normal  cervical lordosis. There is no listhesis. Vertebral body heights are  preserved. Disc space heights demonstrate mild loss disc space height  C5/6. Vertebral body signal demonstrates no focal marrow edema.  Spinal cord signal is unremarkable.      Evaluation of spinal levels are as follows:      C2/3 is unremarkable.      C3/4 is unremarkable.      C4/5 is unremarkable.      C5/6 demonstrates mild disc bulge with mild bilateral uncovertebral  joint hypertrophy. Subtle asymmetric component of right paracentral  disc protrusion measuring 1 mm. There is a effacement of the ventral  CSF space without flattening of the cord. No canal stenosis. Foramina  are unremarkable      C6/7 demonstrates central annulus tear. There is no disc herniation  demonstrated. No narrowing of the spinal canal or foramina. Minimal  disc bulge demonstrated.      C7/T1 is unremarkable                      IMPRESSION:  1. Mild disc bulge " C5/6 with subtle asymmetric right paracentral disc  protrusion with mild effacement of the ventral CSF space. No  narrowing of the spinal canal.      2. Central annulus tear C6/7 without disc herniation.      3. 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    1) RLE pain, improved  -Since 2020 associated with walking on incline treadmill limiting ability to work out and function, impairing QoL  -Refractive to >3 y conservative tx including Tylenol, NSAIDs, PT, >6 w home exercise tx, cyclobenzaprine  -MR R tib/fib 1/4/24: gastroc strain  -MRI L-spine 2/23/24: multilevel spondylosis featuring left>right L5-S1 perineural cysts at L5 n roots  -Should pain worsen, consider L5-S1 ILESI      2) Neck pain  -Chronic neck pain radiating to R shoulder without objective neuro deficit inhibiting ability to work and maintain QoL  -Refractive to yrs of conservative tx including Tylenol, NSAIDs, PT, >6 w home exercise tx, cyclobenzaprine, duloxetine, ice, heat, gabapentin, MDP  -MRI c-spine 12/26/23: multilevel spondylosis featuring C5-6 R-sided disc protrusion effacing dura, C6-7 annulus tear  -RYAN 3/7/24: 50% relief 1 mo  --rCESI 6/11/24: 50% relief <1 w  -Reviewed/discussed ortho spine Dr Gibbs note 9/19/24 where surgery was not rec'd. Advised aerobic exercise, m strengthening- noted possible ligamentous laxity. Suggested facet blocks  -XR R shoulder to r/o shoulder etiology  -EMG BUE to elucidate neuropathic sx  -Schedule prognostic R C3-4, C4-5 MBNB under fluoro with LA only to assess candidacy for RFA  -Consider Botox      Discussed procedure risks/benefits in detail with patient. Pt meets medical necessity for procedure due to failure of conservative measures. Reviewed procedural risks including bleeding, infection, nerve damage, paralysis. Also reviewed mitigating factors such  as screening for infection/blood thinner use, sterile precautions, and image-guidance when applicable. All questions answered. Pt/guardian expressed understanding and choose to proceed    Today's visit involved continuation of chronic pain care. In the context of the complexity of this patient's chronic pain diagnosis, long-term expectations and care planning discussed. Adequate time taken to ensure patient understanding and answer questions. Imaging studies ordered are placed do elucidate the patient's diagnosis, but also to evaluate the patient's candidacy for procedural and surgical interventions. The risks and benefits of these potential interventions are detailed as above.             Sonu Butler MD  Anesthesiologist & Interventional Pain Physician   [facility] [place]  O: [phone]  F: [phone]  2:21 PM  02/12/25

## 2025-02-27 ENCOUNTER — HOSPITAL ENCOUNTER (OUTPATIENT)
Dept: NEUROLOGY | Facility: CLINIC | Age: 40
Discharge: HOME | End: 2025-02-27
Payer: COMMERCIAL

## 2025-02-27 DIAGNOSIS — M54.12 CERVICAL RADICULITIS: ICD-10-CM

## 2025-02-27 PROCEDURE — 95911 NRV CNDJ TEST 9-10 STUDIES: CPT | Performed by: PSYCHIATRY & NEUROLOGY

## 2025-02-27 PROCEDURE — 95886 MUSC TEST DONE W/N TEST COMP: CPT | Performed by: PSYCHIATRY & NEUROLOGY

## 2025-03-13 ENCOUNTER — HOSPITAL ENCOUNTER (OUTPATIENT)
Dept: GASTROENTEROLOGY | Facility: HOSPITAL | Age: 40
Discharge: HOME | End: 2025-03-13
Payer: COMMERCIAL

## 2025-03-13 VITALS
HEART RATE: 64 BPM | DIASTOLIC BLOOD PRESSURE: 71 MMHG | RESPIRATION RATE: 17 BRPM | SYSTOLIC BLOOD PRESSURE: 106 MMHG | WEIGHT: 142 LBS | OXYGEN SATURATION: 100 % | BODY MASS INDEX: 22.82 KG/M2 | TEMPERATURE: 97.9 F | HEIGHT: 66 IN

## 2025-03-13 DIAGNOSIS — M47.812 CERVICAL SPONDYLOSIS: ICD-10-CM

## 2025-03-13 PROCEDURE — 2500000004 HC RX 250 GENERAL PHARMACY W/ HCPCS (ALT 636 FOR OP/ED): Performed by: ANESTHESIOLOGY

## 2025-03-13 PROCEDURE — 64491 INJ PARAVERT F JNT C/T 2 LEV: CPT | Performed by: ANESTHESIOLOGY

## 2025-03-13 PROCEDURE — 64490 INJ PARAVERT F JNT C/T 1 LEV: CPT | Performed by: ANESTHESIOLOGY

## 2025-03-13 RX ORDER — BUPIVACAINE HYDROCHLORIDE 5 MG/ML
INJECTION, SOLUTION EPIDURAL; INTRACAUDAL AS NEEDED
Status: COMPLETED | OUTPATIENT
Start: 2025-03-13 | End: 2025-03-13

## 2025-03-13 RX ADMIN — BUPIVACAINE HYDROCHLORIDE 1.5 ML: 5 INJECTION, SOLUTION EPIDURAL; INTRACAUDAL; PERINEURAL at 10:11

## 2025-03-13 ASSESSMENT — PAIN SCALES - GENERAL
PAINLEVEL_OUTOF10: 1
PAINLEVEL_OUTOF10: 2

## 2025-03-13 ASSESSMENT — PAIN DESCRIPTION - DESCRIPTORS
DESCRIPTORS: SORE
DESCRIPTORS: ACHING

## 2025-03-13 ASSESSMENT — COLUMBIA-SUICIDE SEVERITY RATING SCALE - C-SSRS
2. HAVE YOU ACTUALLY HAD ANY THOUGHTS OF KILLING YOURSELF?: NO
1. IN THE PAST MONTH, HAVE YOU WISHED YOU WERE DEAD OR WISHED YOU COULD GO TO SLEEP AND NOT WAKE UP?: NO
6. HAVE YOU EVER DONE ANYTHING, STARTED TO DO ANYTHING, OR PREPARED TO DO ANYTHING TO END YOUR LIFE?: NO

## 2025-03-13 ASSESSMENT — PAIN - FUNCTIONAL ASSESSMENT
PAIN_FUNCTIONAL_ASSESSMENT: 0-10
PAIN_FUNCTIONAL_ASSESSMENT: 0-10

## 2025-03-13 NOTE — POST-PROCEDURE NOTE
1020-Patient brought back from procedure room. Bandaid to neck dry and intact. Patient states neck pain rated 2/10, denies any weakness or decrease in sensation. Discharge instructions explained to patient and copy provided. Patient denies any questions. Patient to follow up as scheduled.

## 2025-03-13 NOTE — DISCHARGE INSTRUCTIONS
DISCHARGE INSTRUCTIONS FOR INJECTIONS     You underwent right cervical medial branch nerve blocks today    Aftermost injections, it is recommended that you relax and limit your activity for the remainder of the day unless you have been told otherwise by your pain physician.  You should not drive a car, operate machinery, or make important legal decisions unless otherwise directed by your pain physician.  You may resume your normal activity, including exercise, tomorrow.      Keep a written pain diary of how much pain relief you experienced following the injection procedure and the length of time of pain relief you experienced pain relief. Following diagnostic injections like medial branch nerve blocks, sacroiliac joint blocks, stellate ganglion injections and other blocks, it is very important you record the specific amount of pain relief you experienced immediately after the injectionand how long it lasted. Your doctor will ask you for this information at your follow up visit.     For all injections, please keep the injection site dry and inspect the site for a couple of days. You may remove the Band-Aid the day of the injection at any time.     Some discomfort, bruising or slight swelling may occur at the injection site. This is not abnormal if it occurs.  If needed you may:    -Take over the counter medication such as Tylenol or Motrin.   -Apply an ice pack for 30 minutes, 2 to 3 times a day for the first 24 hours.     You may shower today; no soaking baths, hot tubs, whirlpools or swimming pools for two days.      If you are given steroids in your injection, it may take 3-5 days for the steroid medication to take effect. You may notice a worsening of your symptoms for 1-2 days after the injection. This is not abnormal.  You may use acetaminophen, ibuprofen, or prescription medication that your doctor may have prescribed for you if you need to do so.     A few common side effects of steroids include facial  flushing, sweating, restlessness, irritability,difficulty sleeping, increase in blood sugar, and increased blood pressure. If you have diabetes, please monitor your blood sugar at least once a day for at least 5 days. If you have poorly controlled high blood pressure, monitoryour blood pressure for at least 2 days and contact your primary care physician if these numbers are unusually high for you.      If you take aspirin or non-steroidal anti-inflammatory drugs (examples are Motrin, Advil, ibuprofen, Naprosyn, Voltaren, Relafen, etc.) you may restart these this evening, but stop taking it 3 days before your next appointment, unless instructed otherwiseby your physician.      You do not need to discontinue non-aspirin-containing pain medications prior to an injection (examples: Celebrex, tramadol, hydrocodone and acetaminophen).      If you take a blood thinning medication (Coumadin, Lovenox, Fragmin,Ticlid, Plavix, Pradaxa, etc.), please discuss this with your primary care physician/cardiologist and your pain physician. These medications MUST be discontinued before you can have an injection safely, without the risk of uncontrolled bleeding. If these medications are not discontinued for an appropriate period of time, you will not be able to receivean injection.      If you are taking Coumadin, please have your INR checked the morning of your procedure and bringthe result to your appointment unless otherwise instructed. If your INR is over 1.2, your injection may need to be rescheduled to avoid uncontrolled bleeding from the needle placement.     [facility] at [phone] between 8am-4pm Monday - Friday if you are experiencing the following:    If you received an epidural or spinal injection:    -Headache that doesnot go away with medicine, is worse when sitting or standing up, and is greatly relieved upon lying down.   -Severe pain worse than or different than your baseline pain.   -Chills  or fever (101º F or greater).   -Drainage or signs of infection at the injection site     Go directly to the Emergency Department if you are experiencing the following and received an epidural or spinal injection:   -Abrupt weakness or progressive weakness in your legs that starts after you leave the clinic.   -Abrupt severe or worsening numbness in your legs.   -Inability to urinate after the injection or loss of bowel or bladder control without the urge to defecate or urinate.     If you have a clinical question that cannot wait until your next appointment, please call [phone] between 8am-4pm Monday - Friday or send a [employer] message. We do our best to return all non-emergency messages within 24 hours, Monday - Friday. A nurse or physician will return your message.      If you need to cancel an appointment, please call the scheduling staff at [phone] during normal business hours or leave a message at least 24 hours in advance.     If you are going to be sedated for your next procedure, you MUST have responsible adult who can legally drive accompany you home. You cannot eat or drink for eight hours prior to the planned procedure if you are going to receive sedation. You may take your non-blood thinning medications with a small sip of water.

## 2025-03-13 NOTE — INTERVAL H&P NOTE
H&P reviewed. The patient was examined and there are no changes to the H&P.  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 Patient's pain stable and persistent from last visit.  Denies allergies to Latex, steroids, local anesthetics, or iodine/contrast. Denies being on blood thinners. Not diabetic.  Denies fever, chills, NS, CP, SOB, cough, N/V.    Discussed procedure risks/benefits in detail with patient. Pt meets medical necessity for procedure due to failure of conservative measures. Reviewed procedural risks including bleeding, infection, nerve damage, paralysis. Also reviewed mitigating factors such as screening for infection/blood thinner use, sterile precautions, and image-guidance when applicable. All questions answered. Pt/guardian expressed understanding and choose to proceed      Sonu Butler MD  Anesthesiologist & Interventional Pain Physician   [facility] [place]  O: [phone]  F: [phone]  9:51 AM  03/13/25

## 2025-03-15 ENCOUNTER — PHARMACY VISIT (OUTPATIENT)
Dept: PHARMACY | Facility: CLINIC | Age: 40
End: 2025-03-15
Payer: COMMERCIAL

## 2025-03-15 DIAGNOSIS — M54.2 ACUTE NECK PAIN: Primary | ICD-10-CM

## 2025-03-15 PROCEDURE — RXMED WILLOW AMBULATORY MEDICATION CHARGE

## 2025-03-15 RX ORDER — METHYLPREDNISOLONE 4 MG/1
TABLET ORAL
Qty: 21 TABLET | Refills: 0 | Status: SHIPPED | OUTPATIENT
Start: 2025-03-15 | End: 2025-03-21

## 2025-03-15 NOTE — PROGRESS NOTES
Pt called that her pain from the CMBB has been exacerbated. She tried 800 mg IBU and lidocaine spray topical, which provided mild relief. She stated the pain is procedure side of the neck trapezial radiation which is normal when exacerbated.     I called in a MDP and recommended reducing activities but she is on day 3rd day of working. She works tomorrow. Advised that her activity and the procedure were likely cause of aggravation. Pt can call if continued issues.

## 2025-03-17 ENCOUNTER — TELEPHONE (OUTPATIENT)
Dept: PAIN MEDICINE | Facility: CLINIC | Age: 40
End: 2025-03-17
Payer: COMMERCIAL

## 2025-03-17 NOTE — TELEPHONE ENCOUNTER
FYI:  2 messages from patient on Saturday March 15th:  10:43am-Had nerve block done and now in pain. Please contact  11:02am-returning a missed call from O/C regarding pain.  Call to patient and no answer. Left voicemail for her to call back.

## 2025-03-31 PROCEDURE — 0753T DGTZ GLS MCRSCP SLD LEVEL IV: CPT

## 2025-03-31 PROCEDURE — 88305 TISSUE EXAM BY PATHOLOGIST: CPT

## 2025-04-01 ENCOUNTER — LAB REQUISITION (OUTPATIENT)
Dept: LAB | Facility: HOSPITAL | Age: 40
End: 2025-04-01
Payer: COMMERCIAL

## 2025-04-01 DIAGNOSIS — N64.81 PTOSIS OF BREAST: ICD-10-CM

## 2025-04-07 LAB
LABORATORY COMMENT REPORT: NORMAL
PATH REPORT.FINAL DX SPEC: NORMAL
PATH REPORT.GROSS SPEC: NORMAL
PATH REPORT.RELEVANT HX SPEC: NORMAL
PATH REPORT.TOTAL CANCER: NORMAL

## 2025-04-23 ENCOUNTER — OFFICE VISIT (OUTPATIENT)
Dept: PAIN MEDICINE | Facility: CLINIC | Age: 40
End: 2025-04-23
Payer: COMMERCIAL

## 2025-04-23 VITALS — SYSTOLIC BLOOD PRESSURE: 94 MMHG | HEART RATE: 73 BPM | DIASTOLIC BLOOD PRESSURE: 60 MMHG | OXYGEN SATURATION: 98 %

## 2025-04-23 DIAGNOSIS — M54.2 CERVICALGIA: ICD-10-CM

## 2025-04-23 DIAGNOSIS — M54.16 LUMBAR RADICULITIS: Primary | ICD-10-CM

## 2025-04-23 DIAGNOSIS — G24.3 CERVICAL DYSTONIA: ICD-10-CM

## 2025-04-23 PROCEDURE — 1036F TOBACCO NON-USER: CPT | Performed by: ANESTHESIOLOGY

## 2025-04-23 PROCEDURE — G2211 COMPLEX E/M VISIT ADD ON: HCPCS | Performed by: ANESTHESIOLOGY

## 2025-04-23 PROCEDURE — 99214 OFFICE O/P EST MOD 30 MIN: CPT | Performed by: ANESTHESIOLOGY

## 2025-04-23 ASSESSMENT — PAIN DESCRIPTION - DESCRIPTORS: DESCRIPTORS: BURNING;THROBBING

## 2025-04-23 ASSESSMENT — ENCOUNTER SYMPTOMS
DEPRESSION: 0
RESPIRATORY NEGATIVE: 1
CONSTITUTIONAL NEGATIVE: 1
HEMATOLOGIC/LYMPHATIC NEGATIVE: 1
GASTROINTESTINAL NEGATIVE: 1
LOSS OF SENSATION IN FEET: 0
EYES NEGATIVE: 1
MYALGIAS: 1
CARDIOVASCULAR NEGATIVE: 1
PSYCHIATRIC NEGATIVE: 1
BACK PAIN: 1
NEUROLOGICAL NEGATIVE: 1
OCCASIONAL FEELINGS OF UNSTEADINESS: 0
NECK PAIN: 1
ENDOCRINE NEGATIVE: 1

## 2025-04-23 ASSESSMENT — PAIN SCALES - GENERAL
PAINLEVEL_OUTOF10: 1
PAINLEVEL_OUTOF10: 1

## 2025-04-23 ASSESSMENT — PAIN - FUNCTIONAL ASSESSMENT: PAIN_FUNCTIONAL_ASSESSMENT: 0-10

## 2025-04-23 NOTE — PROGRESS NOTES
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     On 3/13, pt underwent R CMBNB without relief. Claims transient worsening in R-sided spastic neck pain. Feels RUE improved, but residual R-sided spastic neck pain remains bothersome, limiting.     Pt denies new-onset numbness, weakness, bowel/bladder incontinence.  Pt denies recent infection, allergy to Latex/iodine/contrast. Patient is currently taking the following blood thinner(s): N/A    Procedure log:  -R CMBNB 3/13/25: minimal relief  -rCESI 6/11/24: 50% relief <1 w  -RYAN 3/7/24: 50% relief 1 mo      REVIEW OF SYSTEMS  Review of Systems   Constitutional: Negative.    HENT: Negative.     Eyes: Negative.    Respiratory: Negative.     Cardiovascular: Negative.    Gastrointestinal: Negative.    Endocrine: Negative.    Musculoskeletal:  Positive for back pain, myalgias and neck pain.   Skin: Negative.    Neurological: Negative.    Hematological: Negative.    Psychiatric/Behavioral: Negative.         PAST MEDICAL HISTORY  Past Medical History:   Diagnosis Date    Anxiety     Cervical pain     DDD (degenerative disc disease), cervical      History reviewed. No pertinent surgical history.  Family History   Problem Relation Name Age of Onset    Breast cancer Mother Mom     No Known Problems Father      No Known Problems Maternal Grandfather         CURRENT MEDICATIONS  Current Outpatient Medications   Medication Sig Dispense Refill    gabapentin (Neurontin) 300 mg capsule Take 1 capsule (300 mg) by mouth 3 times a day. (Patient not taking: Reported on 4/23/2025)       No current facility-administered medications for this visit.       ALLERGIES AND DRUG REACTIONS  Allergies   Allergen Reactions    Codeine Itching          OBJECTIVE  Visit Vitals  BP 94/60   Pulse  73   SpO2 98%   OB Status Having periods   Smoking Status Never       Last Recorded Pain Score (if available):         Pain Score:   1       Physical Exam  General: Sitting in chair, NAD  Head: NCAT  Eyes: Sclera/conjunctiva clear, EOMI, PERRL  Nose/mouth: MMM  CV: Good distal pulses  Lungs: Good/equal chest excursion  Abdomen: Soft, ND  Ext: No cyanosis/edema  MSK: c-spine alignment: 5 deg R laterocollis, R paraspinal m TTP with palpable TP, c-spine ROM: full. Neg Spurling, LHermitte  R shoulder passive/active ROM full. Neg Hawken, empty can    Neuro: AAOx3, CN grossly nl  Dermatome sensation to light touch  LEFT C5: WNL    RIGHT C5: WNL      LEFT C6: WNL       RIGHT C6: WNL      LEFT C7: WNL       RIGHT C7: WNL      LEFT C8: WNL       RIGHT C8: WNL      LEFT T1: WNL       RIGHT T1: WNL    Motor strength  LEFT C5 (elbow flexion): 5/5   RIGHT C5: 5/5  LEFT C6 (wrist extension): 5/5     RIGHT C6: 5/5  LEFT C7 (elbow extension): 5/5     RIGHT C7: 5/5  LEFT C8 (finger abduction): 5/5     RIGHT C8: 5/5  LEFT T1 (hand ): 5/5     RIGHT T1: 5/5    Special testing  Pettit: neg BL    Psych: affect nl  Skin: no rash/lesions      REVIEW OF LABORATORY DATA  I have reviewed the following lab results:  WBC   Date Value Ref Range Status   10/18/2024 5.9 4.4 - 11.3 x10*3/uL Final     RBC   Date Value Ref Range Status   10/18/2024 4.34 4.00 - 5.20 x10*6/uL Final     Hemoglobin   Date Value Ref Range Status   10/18/2024 13.5 12.0 - 16.0 g/dL Final     Hematocrit   Date Value Ref Range Status   10/18/2024 41.0 36.0 - 46.0 % Final     MCV   Date Value Ref Range Status   10/18/2024 95 80 - 100 fL Final     MCH   Date Value Ref Range Status   10/18/2024 31.1 26.0 - 34.0 pg Final     MCHC   Date Value Ref Range Status   10/18/2024 32.9 32.0 - 36.0 g/dL Final     RDW   Date Value Ref Range Status   10/18/2024 12.0 11.5 - 14.5 % Final     Platelets   Date Value Ref Range Status   10/18/2024 326 150 - 450 x10*3/uL Final     MPV   Date  "Value Ref Range Status   08/31/2023 9.8 7.0 - 12.6 CU Final     Sodium   Date Value Ref Range Status   08/31/2023 143 133 - 145 MMOL/L Final     Potassium   Date Value Ref Range Status   08/31/2023 3.7 3.4 - 5.1 MMOL/L Final     Bicarbonate   Date Value Ref Range Status   08/31/2023 22 (L) 24 - 31 MMOL/L Final     Urea Nitrogen   Date Value Ref Range Status   08/31/2023 5 (L) 8 - 25 MG/DL Final     Calcium   Date Value Ref Range Status   08/31/2023 8.5 8.5 - 10.4 MG/DL Final     No results found for: \"PROTIME\", \"PTT\", \"INR\", \"FIBRINOGEN\"      REVIEW OF RADIOLOGY   I have reviewed the following:  Radiology Studies           MRI R tib/fib 1/4/24:  1. Muscle strain of the medial and lateral gastrocnemius musculature  with mild feathery appearing interstitial muscle edema without  myotendinous tear or intramuscular hematoma formation. There is mild  edema deep to the muscle belly in the inter fascial plane with the  soleus muscle. No distal medial head gastrocnemius or soleus  myotendinous tear.      2. No stress reaction or stress fracture within the right  tibia/fibula.        MRI c-spine 12/26/23:  Alignment of the cervical spine demonstrates mild reversal normal  cervical lordosis. There is no listhesis. Vertebral body heights are  preserved. Disc space heights demonstrate mild loss disc space height  C5/6. Vertebral body signal demonstrates no focal marrow edema.  Spinal cord signal is unremarkable.      Evaluation of spinal levels are as follows:      C2/3 is unremarkable.      C3/4 is unremarkable.      C4/5 is unremarkable.      C5/6 demonstrates mild disc bulge with mild bilateral uncovertebral  joint hypertrophy. Subtle asymmetric component of right paracentral  disc protrusion measuring 1 mm. There is a effacement of the ventral  CSF space without flattening of the cord. No canal stenosis. Foramina  are unremarkable      C6/7 demonstrates central annulus tear. There is no disc herniation  demonstrated. No " narrowing of the spinal canal or foramina. Minimal  disc bulge demonstrated.      C7/T1 is unremarkable                      IMPRESSION:  1. Mild disc bulge C5/6 with subtle asymmetric right paracentral disc  protrusion with mild effacement of the ventral CSF space. No  narrowing of the spinal canal.      2. Central annulus tear C6/7 without disc herniation.      3. 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    1) RLE pain, improved  -Since 2020 associated with walking on incline treadmill limiting ability to work out and function, impairing QoL  -Refractive to >3 y conservative tx including Tylenol, NSAIDs, PT, >6 w home exercise tx, cyclobenzaprine  -MR R tib/fib 1/4/24: gastroc strain  -MRI L-spine 2/23/24: multilevel spondylosis featuring left>right L5-S1 perineural cysts at L5 n roots  -Should pain worsen, consider L5-S1 ILESI      2) Neck pain  -Chronic neck pain radiating to ipsilateral occiput, R shoulder without objective neuro deficit inhibiting ability to work and maintain QoL  -Refractive to yrs of conservative tx including Tylenol, NSAIDs, PT, >6 w home exercise tx, cyclobenzaprine, duloxetine, ice, heat, gabapentin, MDP, RYAN, CMBNB  -MRI c-spine 12/26/23: multilevel spondylosis featuring C5-6 R-sided disc protrusion effacing dura, C6-7 annulus tear  -ortho spine Dr Gibbs note 9/19/24 where surgery was not rec'd. Advised aerobic exercise, m strengthening- noted possible ligamentous laxity. Suggested facet blocks  -Reviewed/discussed XR R shoulder 2/12/25: unremarkable. PE neg for shoulder provocation- MRI shoulder unlikely to be beneficial  -Reviewed/discussed EMG BUE 2/27/25: unremarkable  -Patient exhibits signs of cervical dystonia. Schedule Botox R scalene complex, SCM, splenius capitis/cervicis, levator scapula, trapezius under US guidance to target  pain generator as seen on PE and minimize risk/likelihood of chronic opioid use and/or surgery        Discussed procedure risks/benefits in detail with patient. Pt meets medical necessity for procedure due to failure of conservative measures. Reviewed procedural risks including bleeding, infection, nerve damage, paralysis. Also reviewed mitigating factors such as screening for infection/blood thinner use, sterile precautions, and image-guidance when applicable. All questions answered. Pt/guardian expressed understanding and choose to proceed    Today's visit involved continuation of chronic pain care. In the context of the complexity of this patient's chronic pain diagnosis, long-term expectations and care planning discussed. Adequate time taken to ensure patient understanding and answer questions. Imaging studies ordered are placed do elucidate the patient's diagnosis, but also to evaluate the patient's candidacy for procedural and surgical interventions. The risks and benefits of these potential interventions are detailed as above.             Sonu Butler MD  Anesthesiologist & Interventional Pain Physician   [facility] [place]  O: [phone]  F: [phone]  1:17 PM  04/23/25

## 2025-06-04 ENCOUNTER — APPOINTMENT (OUTPATIENT)
Dept: PAIN MEDICINE | Facility: CLINIC | Age: 40
End: 2025-06-04
Payer: COMMERCIAL

## 2025-06-09 DIAGNOSIS — G24.3 CERVICAL DYSTONIA: Primary | ICD-10-CM

## 2025-06-10 ENCOUNTER — PHARMACY VISIT (OUTPATIENT)
Dept: PHARMACY | Facility: CLINIC | Age: 40
End: 2025-06-10
Payer: COMMERCIAL

## 2025-06-10 ENCOUNTER — SPECIALTY PHARMACY (OUTPATIENT)
Dept: PHARMACY | Facility: CLINIC | Age: 40
End: 2025-06-10

## 2025-06-10 PROCEDURE — RXMED WILLOW AMBULATORY MEDICATION CHARGE

## 2025-06-10 RX ORDER — AMOXICILLIN 500 MG/1
500 CAPSULE ORAL 4 TIMES DAILY
Qty: 28 CAPSULE | Refills: 0 | OUTPATIENT
Start: 2025-06-10

## 2025-06-11 ENCOUNTER — SPECIALTY PHARMACY (OUTPATIENT)
Dept: PHARMACY | Facility: CLINIC | Age: 40
End: 2025-06-11

## 2025-06-24 ENCOUNTER — PHARMACY VISIT (OUTPATIENT)
Dept: PHARMACY | Facility: CLINIC | Age: 40
End: 2025-06-24
Payer: COMMERCIAL

## 2025-07-02 ENCOUNTER — OFFICE VISIT (OUTPATIENT)
Dept: PAIN MEDICINE | Facility: CLINIC | Age: 40
End: 2025-07-02
Payer: COMMERCIAL

## 2025-07-02 VITALS — OXYGEN SATURATION: 98 % | DIASTOLIC BLOOD PRESSURE: 60 MMHG | HEART RATE: 70 BPM | SYSTOLIC BLOOD PRESSURE: 122 MMHG

## 2025-07-02 DIAGNOSIS — G24.3 CERVICAL DYSTONIA: Primary | ICD-10-CM

## 2025-07-02 PROCEDURE — 76942 ECHO GUIDE FOR BIOPSY: CPT | Performed by: ANESTHESIOLOGY

## 2025-07-02 PROCEDURE — 64616 CHEMODENERV MUSC NECK DYSTON: CPT | Performed by: ANESTHESIOLOGY

## 2025-07-02 PROCEDURE — 2500000004 HC RX 250 GENERAL PHARMACY W/ HCPCS (ALT 636 FOR OP/ED): Mod: JZ | Performed by: ANESTHESIOLOGY

## 2025-07-02 RX ADMIN — ONABOTULINUMTOXINA 100 UNITS: 100 INJECTION, POWDER, LYOPHILIZED, FOR SOLUTION INTRADERMAL; INTRAMUSCULAR at 14:46

## 2025-07-02 ASSESSMENT — PAIN DESCRIPTION - DESCRIPTORS: DESCRIPTORS: BURNING

## 2025-07-02 ASSESSMENT — ENCOUNTER SYMPTOMS
DEPRESSION: 0
OCCASIONAL FEELINGS OF UNSTEADINESS: 0
LOSS OF SENSATION IN FEET: 0

## 2025-07-02 ASSESSMENT — PAIN - FUNCTIONAL ASSESSMENT: PAIN_FUNCTIONAL_ASSESSMENT: 0-10

## 2025-07-02 ASSESSMENT — PAIN SCALES - GENERAL
PAINLEVEL_OUTOF10: 1
PAINLEVEL_OUTOF10: 1

## 2025-07-02 NOTE — PROGRESS NOTES
Performed by: Sonu Butler MD  Authorized by: Sonu Butler MD          Procedure: Botox cervical dystonia    After informed consent, pt was positioned seated at the edge of the examination table. Forehead, bilateral temples, occiput, posterior neck, and bilateral upper shoulders were prepped with alcohol. 100 units of botulinum toxin were reconstituted with 10 ml preservative-free normal saline to create a 10 unit/ml solution. The following muscle sites were injected as follows for a total of (100 units):    -10 units in right splenius capitis  -10 units in right splenius cervicis  -20 units in right levator scapulae  -40 units in right trapezius  -20 units in right rhomboids    Ultrasound was utilized to identify muscles and guide injections. Relevant images saved.     Hemostasis achieved. Patient tolerated procedure well and was discharged home in stable condition.     F/U 4-6 w      Sonu Butler MD  Anesthesiologist & Interventional Pain Physician   [facility] [place]  O: [phone]  F: [phone]  1:19 PM  07/02/25

## 2025-07-22 ENCOUNTER — PATIENT MESSAGE (OUTPATIENT)
Dept: PRIMARY CARE | Facility: CLINIC | Age: 40
End: 2025-07-22
Payer: COMMERCIAL

## 2025-07-22 DIAGNOSIS — B00.1 COLD SORE: Primary | ICD-10-CM

## 2025-07-22 RX ORDER — VALACYCLOVIR HYDROCHLORIDE 1 G/1
2000 TABLET, FILM COATED ORAL 2 TIMES DAILY
Qty: 4 TABLET | Refills: 5 | Status: SHIPPED | OUTPATIENT
Start: 2025-07-22

## 2025-07-23 ENCOUNTER — APPOINTMENT (OUTPATIENT)
Dept: PRIMARY CARE | Facility: CLINIC | Age: 40
End: 2025-07-23
Payer: COMMERCIAL

## 2025-07-24 ENCOUNTER — OFFICE VISIT (OUTPATIENT)
Dept: PRIMARY CARE | Facility: CLINIC | Age: 40
End: 2025-07-24
Payer: COMMERCIAL

## 2025-07-24 VITALS
BODY MASS INDEX: 23.08 KG/M2 | WEIGHT: 143 LBS | HEART RATE: 72 BPM | OXYGEN SATURATION: 98 % | DIASTOLIC BLOOD PRESSURE: 82 MMHG | SYSTOLIC BLOOD PRESSURE: 116 MMHG

## 2025-07-24 DIAGNOSIS — N89.8 VAGINAL ITCHING: Primary | ICD-10-CM

## 2025-07-24 DIAGNOSIS — N89.8 VAGINAL IRRITATION: ICD-10-CM

## 2025-07-24 PROCEDURE — 99213 OFFICE O/P EST LOW 20 MIN: CPT

## 2025-07-24 RX ORDER — FLUCONAZOLE 150 MG/1
150 TABLET ORAL DAILY
Qty: 2 TABLET | Refills: 0 | Status: SHIPPED | OUTPATIENT
Start: 2025-07-24

## 2025-07-24 ASSESSMENT — ENCOUNTER SYMPTOMS
CHILLS: 0
DIARRHEA: 0
HEMATURIA: 0
NAUSEA: 0
VOMITING: 0
FLANK PAIN: 0
FREQUENCY: 0
FEVER: 0
DYSURIA: 0

## 2025-07-24 ASSESSMENT — PAIN SCALES - GENERAL: PAINLEVEL_OUTOF10: 0-NO PAIN

## 2025-07-24 NOTE — PROGRESS NOTES
Subjective   Patient ID: Nehal Rodriguez is a 40 y.o. female who presents for Vaginal Itching (Pt is here for vaginal itching, for about a week / nr).    Vaginal Itching  The patient's primary symptoms include genital itching and a genital rash. The patient's pertinent negatives include no genital lesions, genital odor or vaginal discharge. Primary symptoms comment: New partner, uses condom, 5 days later. This is a new problem. Pertinent negatives include no chills, diarrhea, dysuria, fever, flank pain, frequency, hematuria, nausea, urgency or vomiting.        Review of Systems   Constitutional:  Negative for chills and fever.   Gastrointestinal:  Negative for diarrhea, nausea and vomiting.   Genitourinary:  Negative for dysuria, flank pain, frequency, hematuria, urgency, vaginal bleeding, vaginal discharge and vaginal pain.       Objective   /82   Pulse 72   Wt 64.9 kg (143 lb)   SpO2 98%   BMI 23.08 kg/m²     Physical Exam  Constitutional:       Appearance: Normal appearance.   Pulmonary:      Effort: Pulmonary effort is normal.   Genitourinary:     Pubic Area: No rash or pubic lice.       Labia:         Right: No tenderness, lesion or injury.         Left: No tenderness, lesion or injury.       Vagina: Erythema present. No tenderness or lesions.      Comments: Mild erythema/inflammation of labia minor and vulva. No ulcers, lesions, or papules visible     Skin:     Findings: No rash.     Neurological:      Mental Status: She is alert.     Psychiatric:         Mood and Affect: Mood and affect normal.         Assessment/Plan   Diagnoses and all orders for this visit:  Vaginal itching  -     fluconazole (Diflucan) 150 mg tablet; Take 1 tablet (150 mg) by mouth once daily.  -     Vaginitis Gram Stain For Bacterial Vaginosis + Yeast  -     Trichomonas vaginalis, Amplified  Herpes swab not available but patient informed no active lesion to swab for herpes. Patient declines other STD testing at this time.  Patient informed to continue cleaning with only warm water, avoid sexual intercourse for the next 1-2 weeks. Wear cotton underwear and change often, avoid wearing liners as this traps in moisture. If all testing negative and irritation still present next week can consider topical clindamycin.

## 2025-07-25 LAB
BV SCORE VAG QL: NORMAL
T VAGINALIS RRNA SPEC QL NAA+PROBE: NOT DETECTED

## 2025-07-28 ENCOUNTER — RESULTS FOLLOW-UP (OUTPATIENT)
Dept: PRIMARY CARE | Facility: CLINIC | Age: 40
End: 2025-07-28
Payer: COMMERCIAL

## 2025-07-28 ENCOUNTER — PATIENT MESSAGE (OUTPATIENT)
Dept: PRIMARY CARE | Facility: CLINIC | Age: 40
End: 2025-07-28
Payer: COMMERCIAL

## 2025-07-28 NOTE — TELEPHONE ENCOUNTER
----- Message from Jeanette Thompson PA-C sent at 7/28/2025  9:46 AM EDT -----      ----- Message -----  From: [facility] Results In  Sent: 7/25/2025   3:45 PM EDT  To: Jeanette Thompson PA-C

## 2025-09-24 ENCOUNTER — APPOINTMENT (OUTPATIENT)
Dept: PAIN MEDICINE | Facility: CLINIC | Age: 40
End: 2025-09-24
Payer: COMMERCIAL

## 2025-10-01 ENCOUNTER — APPOINTMENT (OUTPATIENT)
Dept: PAIN MEDICINE | Facility: CLINIC | Age: 40
End: 2025-10-01
Payer: COMMERCIAL